# Patient Record
Sex: FEMALE | Race: WHITE | Employment: PART TIME
[De-identification: names, ages, dates, MRNs, and addresses within clinical notes are randomized per-mention and may not be internally consistent; named-entity substitution may affect disease eponyms.]

---

## 2017-01-27 ENCOUNTER — OFFICE VISIT (OUTPATIENT)
Dept: UROLOGY | Facility: CLINIC | Age: 27
End: 2017-01-27

## 2017-01-27 VITALS
TEMPERATURE: 98.4 F | HEIGHT: 63 IN | DIASTOLIC BLOOD PRESSURE: 73 MMHG | BODY MASS INDEX: 43.23 KG/M2 | SYSTOLIC BLOOD PRESSURE: 110 MMHG | WEIGHT: 244 LBS | HEART RATE: 82 BPM

## 2017-01-27 DIAGNOSIS — R35.0 URINARY FREQUENCY: ICD-10-CM

## 2017-01-27 DIAGNOSIS — R39.198 DIFFICULTY URINATING: Primary | ICD-10-CM

## 2017-01-27 LAB
APPEARANCE FLUID: ABNORMAL
BILIRUBIN, POC: ABNORMAL
BLOOD URINE, POC: ABNORMAL
CLARITY, POC: ABNORMAL
COLOR, POC: YELLOW
GLUCOSE URINE, POC: ABNORMAL
KETONES, POC: 80
LEUKOCYTE EST, POC: ABNORMAL
NITRITE, POC: ABNORMAL
PH, POC: 6
POST VOID RESIDUAL (PVR): 26 ML
PROTEIN, POC: ABNORMAL
SPECIFIC GRAVITY, POC: 1.01
UROBILINOGEN, POC: 0.2

## 2017-01-27 PROCEDURE — 81002 URINALYSIS NONAUTO W/O SCOPE: CPT | Performed by: UROLOGY

## 2017-01-27 PROCEDURE — 99204 OFFICE O/P NEW MOD 45 MIN: CPT | Performed by: UROLOGY

## 2017-01-27 PROCEDURE — 51798 US URINE CAPACITY MEASURE: CPT | Performed by: UROLOGY

## 2017-01-27 ASSESSMENT — ENCOUNTER SYMPTOMS
SHORTNESS OF BREATH: 1
WHEEZING: 1
GASTROINTESTINAL NEGATIVE: 1
COUGH: 1

## 2017-02-15 ENCOUNTER — TELEPHONE (OUTPATIENT)
Dept: FAMILY MEDICINE CLINIC | Facility: CLINIC | Age: 27
End: 2017-02-15

## 2017-02-15 RX ORDER — LEVOTHYROXINE SODIUM 0.12 MG/1
TABLET ORAL
Qty: 30 TABLET | Refills: 0 | Status: SHIPPED | OUTPATIENT
Start: 2017-02-15 | End: 2017-04-03 | Stop reason: SDUPTHER

## 2017-03-06 ENCOUNTER — TELEPHONE (OUTPATIENT)
Dept: FAMILY MEDICINE CLINIC | Facility: CLINIC | Age: 27
End: 2017-03-06

## 2017-03-06 RX ORDER — ALBUTEROL SULFATE 90 UG/1
2 AEROSOL, METERED RESPIRATORY (INHALATION) 4 TIMES DAILY
Qty: 1 INHALER | Refills: 0 | Status: SHIPPED | OUTPATIENT
Start: 2017-03-06 | End: 2017-03-06 | Stop reason: ALTCHOICE

## 2017-03-06 RX ORDER — ALBUTEROL SULFATE 90 UG/1
2 AEROSOL, METERED RESPIRATORY (INHALATION) EVERY 6 HOURS PRN
Qty: 1 INHALER | Refills: 3 | Status: SHIPPED | OUTPATIENT
Start: 2017-03-06 | End: 2017-08-28 | Stop reason: SDUPTHER

## 2017-03-31 ENCOUNTER — TELEPHONE (OUTPATIENT)
Dept: FAMILY MEDICINE CLINIC | Age: 27
End: 2017-03-31

## 2017-03-31 DIAGNOSIS — E03.9 HYPOTHYROIDISM, UNSPECIFIED TYPE: Primary | ICD-10-CM

## 2017-04-03 RX ORDER — LEVOTHYROXINE SODIUM 0.12 MG/1
TABLET ORAL
Qty: 30 TABLET | Refills: 3 | Status: SHIPPED | OUTPATIENT
Start: 2017-04-03 | End: 2017-08-28 | Stop reason: SDUPTHER

## 2017-04-07 ENCOUNTER — OFFICE VISIT (OUTPATIENT)
Dept: FAMILY MEDICINE CLINIC | Age: 27
End: 2017-04-07
Payer: MEDICAID

## 2017-04-07 ENCOUNTER — HOSPITAL ENCOUNTER (OUTPATIENT)
Age: 27
Setting detail: SPECIMEN
Discharge: HOME | End: 2017-04-07
Payer: MEDICAID

## 2017-04-07 VITALS
WEIGHT: 214.2 LBS | SYSTOLIC BLOOD PRESSURE: 139 MMHG | HEIGHT: 63 IN | TEMPERATURE: 97.3 F | DIASTOLIC BLOOD PRESSURE: 84 MMHG | BODY MASS INDEX: 37.95 KG/M2 | HEART RATE: 112 BPM

## 2017-04-07 DIAGNOSIS — E10.9 TYPE 1 DIABETES MELLITUS WITHOUT COMPLICATION (HCC): Primary | ICD-10-CM

## 2017-04-07 DIAGNOSIS — Z23 IMMUNIZATION DUE: ICD-10-CM

## 2017-04-07 DIAGNOSIS — N92.6 IRREGULAR MENSES: ICD-10-CM

## 2017-04-07 DIAGNOSIS — J45.40 MODERATE PERSISTENT ASTHMA WITHOUT COMPLICATION: ICD-10-CM

## 2017-04-07 LAB
HBA1C MFR BLD: 9.6 %
VITAMIN D 25-HYDROXY: 14.9 NG/ML (ref 30–100)

## 2017-04-07 PROCEDURE — 90715 TDAP VACCINE 7 YRS/> IM: CPT | Performed by: FAMILY MEDICINE

## 2017-04-07 PROCEDURE — 99213 OFFICE O/P EST LOW 20 MIN: CPT | Performed by: FAMILY MEDICINE

## 2017-04-07 PROCEDURE — 83036 HEMOGLOBIN GLYCOSYLATED A1C: CPT | Performed by: FAMILY MEDICINE

## 2017-04-07 PROCEDURE — 90472 IMMUNIZATION ADMIN EACH ADD: CPT | Performed by: FAMILY MEDICINE

## 2017-04-07 PROCEDURE — 90471 IMMUNIZATION ADMIN: CPT | Performed by: FAMILY MEDICINE

## 2017-04-07 PROCEDURE — 90732 PPSV23 VACC 2 YRS+ SUBQ/IM: CPT | Performed by: FAMILY MEDICINE

## 2017-04-07 RX ORDER — BUSPIRONE HYDROCHLORIDE 10 MG/1
15 TABLET ORAL 2 TIMES DAILY
COMMUNITY
End: 2020-10-21 | Stop reason: DRUGHIGH

## 2017-04-07 RX ORDER — INSULIN GLARGINE 100 [IU]/ML
30 INJECTION, SOLUTION SUBCUTANEOUS NIGHTLY
Qty: 1 VIAL | Refills: 3 | Status: SHIPPED | OUTPATIENT
Start: 2017-04-07 | End: 2017-04-11 | Stop reason: SDUPTHER

## 2017-04-07 RX ORDER — VENLAFAXINE HYDROCHLORIDE 150 MG/1
75 CAPSULE, EXTENDED RELEASE ORAL DAILY
COMMUNITY
End: 2019-01-11 | Stop reason: DRUGHIGH

## 2017-04-07 RX ORDER — FLUTICASONE PROPIONATE 110 UG/1
2 AEROSOL, METERED RESPIRATORY (INHALATION) 2 TIMES DAILY
Qty: 1 INHALER | Refills: 3 | Status: SHIPPED | OUTPATIENT
Start: 2017-04-07 | End: 2017-04-26 | Stop reason: ALTCHOICE

## 2017-04-07 ASSESSMENT — ENCOUNTER SYMPTOMS
VOMITING: 0
WHEEZING: 0
BACK PAIN: 0
PHOTOPHOBIA: 0
NAUSEA: 0
CONSTIPATION: 0
EYE DISCHARGE: 0
ABDOMINAL PAIN: 0
DIARRHEA: 0
SHORTNESS OF BREATH: 0
COUGH: 0

## 2017-04-09 DIAGNOSIS — E55.9 VITAMIN D DEFICIENCY: Primary | ICD-10-CM

## 2017-04-09 RX ORDER — ERGOCALCIFEROL 1.25 MG/1
50000 CAPSULE ORAL WEEKLY
Qty: 8 CAPSULE | Refills: 0 | Status: SHIPPED | OUTPATIENT
Start: 2017-04-09 | End: 2017-10-05 | Stop reason: ALTCHOICE

## 2017-04-11 DIAGNOSIS — E10.9 TYPE 1 DIABETES MELLITUS WITHOUT COMPLICATION (HCC): ICD-10-CM

## 2017-04-11 RX ORDER — INSULIN GLARGINE 100 [IU]/ML
30 INJECTION, SOLUTION SUBCUTANEOUS NIGHTLY
Qty: 1 VIAL | Refills: 3 | Status: SHIPPED | OUTPATIENT
Start: 2017-04-11 | End: 2017-04-26 | Stop reason: ALTCHOICE

## 2017-04-26 DIAGNOSIS — J45.30 MILD PERSISTENT ASTHMA WITHOUT COMPLICATION: ICD-10-CM

## 2017-04-26 DIAGNOSIS — E10.9 TYPE 1 DIABETES MELLITUS WITHOUT COMPLICATION (HCC): Primary | ICD-10-CM

## 2017-05-04 ENCOUNTER — TELEPHONE (OUTPATIENT)
Dept: FAMILY MEDICINE CLINIC | Age: 27
End: 2017-05-04

## 2017-05-04 DIAGNOSIS — J45.30 MILD PERSISTENT ASTHMA WITHOUT COMPLICATION: ICD-10-CM

## 2017-05-16 ENCOUNTER — HOSPITAL ENCOUNTER (OUTPATIENT)
Age: 27
Setting detail: SPECIMEN
Discharge: HOME | End: 2017-05-16
Payer: MEDICAID

## 2017-05-16 ENCOUNTER — OFFICE VISIT (OUTPATIENT)
Dept: OBGYN | Age: 27
End: 2017-05-16
Payer: MEDICAID

## 2017-05-16 VITALS
BODY MASS INDEX: 37.56 KG/M2 | HEART RATE: 81 BPM | WEIGHT: 212 LBS | HEIGHT: 63 IN | DIASTOLIC BLOOD PRESSURE: 73 MMHG | SYSTOLIC BLOOD PRESSURE: 125 MMHG

## 2017-05-16 DIAGNOSIS — Z01.419 WELL WOMAN EXAM WITH ROUTINE GYNECOLOGICAL EXAM: Primary | ICD-10-CM

## 2017-05-16 DIAGNOSIS — N92.6 IRREGULAR MENSES: ICD-10-CM

## 2017-05-16 DIAGNOSIS — N91.5 OLIGOMENORRHEA: ICD-10-CM

## 2017-05-16 PROBLEM — N93.9 ABNORMAL UTERINE BLEEDING: Status: ACTIVE | Noted: 2017-05-16

## 2017-05-16 LAB
DIRECT EXAM: ABNORMAL
FOLLICLE STIMULATING HORMONE: 4.4 U/L (ref 1.7–21.5)
HCG(URINE) PREGNANCY TEST: NEGATIVE
LH: 8.4 U/L (ref 1–95.6)
Lab: ABNORMAL
PROLACTIN: 10.29 UG/L (ref 4.79–23.3)
SPECIMEN DESCRIPTION: ABNORMAL
STATUS: ABNORMAL

## 2017-05-16 PROCEDURE — 99395 PREV VISIT EST AGE 18-39: CPT | Performed by: STUDENT IN AN ORGANIZED HEALTH CARE EDUCATION/TRAINING PROGRAM

## 2017-05-16 PROCEDURE — 84146 ASSAY OF PROLACTIN: CPT

## 2017-05-16 PROCEDURE — 83001 ASSAY OF GONADOTROPIN (FSH): CPT

## 2017-05-16 PROCEDURE — 36415 COLL VENOUS BLD VENIPUNCTURE: CPT

## 2017-05-16 PROCEDURE — 83002 ASSAY OF GONADOTROPIN (LH): CPT

## 2017-05-17 LAB
C TRACH DNA GENITAL QL NAA+PROBE: NEGATIVE
N. GONORRHOEAE DNA: NEGATIVE

## 2017-05-23 LAB
CYTOLOGY REPORT: NORMAL
HPV SAMPLE: NORMAL
HPV SOURCE: NORMAL
HPV, GENOTYPE 16: NOT DETECTED
HPV, GENOTYPE 18: NOT DETECTED
HPV, HIGH RISK OTHER: NOT DETECTED
HPV, INTERPRETATION: NORMAL

## 2017-06-27 ENCOUNTER — OFFICE VISIT (OUTPATIENT)
Dept: OBGYN | Age: 27
End: 2017-06-27
Payer: MEDICAID

## 2017-06-27 VITALS
WEIGHT: 203 LBS | HEIGHT: 64 IN | SYSTOLIC BLOOD PRESSURE: 124 MMHG | DIASTOLIC BLOOD PRESSURE: 78 MMHG | BODY MASS INDEX: 34.66 KG/M2 | HEART RATE: 90 BPM

## 2017-06-27 DIAGNOSIS — N92.6 IRREGULAR MENSES: Primary | ICD-10-CM

## 2017-06-27 DIAGNOSIS — N91.5 OLIGOMENORRHEA: ICD-10-CM

## 2017-06-27 DIAGNOSIS — B37.2 YEAST INFECTION OF THE SKIN: ICD-10-CM

## 2017-06-27 DIAGNOSIS — Z30.430 ENCOUNTER FOR INSERTION OF MIRENA IUD: ICD-10-CM

## 2017-06-27 PROBLEM — R87.629 ABNORMAL PAP SMEAR OF VAGINA: Status: ACTIVE | Noted: 2017-06-27

## 2017-06-27 LAB
CONTROL: NORMAL
PREGNANCY TEST URINE, POC: NEGATIVE

## 2017-06-27 PROCEDURE — 81025 URINE PREGNANCY TEST: CPT | Performed by: STUDENT IN AN ORGANIZED HEALTH CARE EDUCATION/TRAINING PROGRAM

## 2017-06-27 PROCEDURE — 58300 INSERT INTRAUTERINE DEVICE: CPT | Performed by: STUDENT IN AN ORGANIZED HEALTH CARE EDUCATION/TRAINING PROGRAM

## 2017-06-27 RX ORDER — KETOCONAZOLE 20 MG/G
CREAM TOPICAL
Qty: 30 G | Refills: 1 | Status: SHIPPED | OUTPATIENT
Start: 2017-06-27 | End: 2019-01-11 | Stop reason: ALTCHOICE

## 2017-07-28 ENCOUNTER — TELEPHONE (OUTPATIENT)
Dept: UROLOGY | Age: 27
End: 2017-07-28

## 2017-08-28 ENCOUNTER — TELEPHONE (OUTPATIENT)
Dept: FAMILY MEDICINE CLINIC | Age: 27
End: 2017-08-28

## 2017-08-28 ENCOUNTER — OFFICE VISIT (OUTPATIENT)
Dept: FAMILY MEDICINE CLINIC | Age: 27
End: 2017-08-28
Payer: MEDICAID

## 2017-08-28 VITALS
DIASTOLIC BLOOD PRESSURE: 69 MMHG | HEART RATE: 88 BPM | TEMPERATURE: 97.2 F | SYSTOLIC BLOOD PRESSURE: 98 MMHG | BODY MASS INDEX: 34.42 KG/M2 | WEIGHT: 201.6 LBS | HEIGHT: 64 IN

## 2017-08-28 DIAGNOSIS — E03.9 HYPOTHYROIDISM, UNSPECIFIED TYPE: ICD-10-CM

## 2017-08-28 DIAGNOSIS — J45.20 MILD INTERMITTENT ASTHMA WITHOUT COMPLICATION: ICD-10-CM

## 2017-08-28 DIAGNOSIS — Z00.00 HEALTHCARE MAINTENANCE: ICD-10-CM

## 2017-08-28 DIAGNOSIS — E10.9 TYPE 1 DIABETES MELLITUS WITHOUT COMPLICATION (HCC): Primary | ICD-10-CM

## 2017-08-28 LAB — HBA1C MFR BLD: 8.9 %

## 2017-08-28 PROCEDURE — 99213 OFFICE O/P EST LOW 20 MIN: CPT | Performed by: FAMILY MEDICINE

## 2017-08-28 PROCEDURE — 83036 HEMOGLOBIN GLYCOSYLATED A1C: CPT | Performed by: FAMILY MEDICINE

## 2017-08-28 RX ORDER — ALBUTEROL SULFATE 90 UG/1
2 AEROSOL, METERED RESPIRATORY (INHALATION) EVERY 6 HOURS PRN
Qty: 1 INHALER | Refills: 3 | Status: SHIPPED | OUTPATIENT
Start: 2017-08-28 | End: 2018-01-08 | Stop reason: SDUPTHER

## 2017-08-28 RX ORDER — ATORVASTATIN CALCIUM 20 MG/1
20 TABLET, FILM COATED ORAL DAILY
Qty: 30 TABLET | Refills: 3 | Status: SHIPPED | OUTPATIENT
Start: 2017-08-28 | End: 2018-01-05 | Stop reason: SDUPTHER

## 2017-08-28 RX ORDER — LEVOTHYROXINE SODIUM 0.12 MG/1
TABLET ORAL
Qty: 30 TABLET | Refills: 3 | Status: SHIPPED | OUTPATIENT
Start: 2017-08-28 | End: 2018-01-02 | Stop reason: SDUPTHER

## 2017-08-28 ASSESSMENT — ENCOUNTER SYMPTOMS
NAUSEA: 0
ABDOMINAL PAIN: 0
WHEEZING: 0
BLURRED VISION: 0
DIARRHEA: 0
VOMITING: 0
CONSTIPATION: 0
SHORTNESS OF BREATH: 0
COUGH: 0

## 2017-08-29 DIAGNOSIS — E10.9 TYPE 1 DIABETES MELLITUS WITHOUT COMPLICATION (HCC): ICD-10-CM

## 2017-09-01 ENCOUNTER — TELEPHONE (OUTPATIENT)
Dept: ADMINISTRATIVE | Age: 27
End: 2017-09-01

## 2017-09-01 DIAGNOSIS — E10.9 TYPE 1 DIABETES MELLITUS WITHOUT COMPLICATION (HCC): ICD-10-CM

## 2017-09-03 DIAGNOSIS — E10.9 TYPE 1 DIABETES MELLITUS WITHOUT COMPLICATION (HCC): ICD-10-CM

## 2017-09-03 RX ORDER — GLUCOSAMINE HCL/CHONDROITIN SU 500-400 MG
CAPSULE ORAL
Qty: 100 STRIP | Refills: 5 | Status: SHIPPED | OUTPATIENT
Start: 2017-09-03 | End: 2017-09-13 | Stop reason: SDUPTHER

## 2017-09-06 ENCOUNTER — OFFICE VISIT (OUTPATIENT)
Dept: FAMILY MEDICINE CLINIC | Age: 27
End: 2017-09-06
Payer: MEDICAID

## 2017-09-06 VITALS
HEIGHT: 63 IN | WEIGHT: 205 LBS | DIASTOLIC BLOOD PRESSURE: 80 MMHG | HEART RATE: 96 BPM | BODY MASS INDEX: 36.32 KG/M2 | TEMPERATURE: 98.1 F | SYSTOLIC BLOOD PRESSURE: 122 MMHG

## 2017-09-06 DIAGNOSIS — K64.9 HEMORRHOIDS, UNSPECIFIED HEMORRHOID TYPE: Primary | ICD-10-CM

## 2017-09-06 PROCEDURE — 99213 OFFICE O/P EST LOW 20 MIN: CPT | Performed by: FAMILY MEDICINE

## 2017-09-06 RX ORDER — DOCUSATE SODIUM 100 MG/1
100 CAPSULE, LIQUID FILLED ORAL DAILY PRN
Qty: 30 CAPSULE | Refills: 1 | Status: SHIPPED | OUTPATIENT
Start: 2017-09-06 | End: 2018-01-08 | Stop reason: SDUPTHER

## 2017-09-06 RX ORDER — GLUCOSAMINE HCL/CHONDROITIN SU 500-400 MG
CAPSULE ORAL
Qty: 100 STRIP | Refills: 5 | Status: CANCELLED | OUTPATIENT
Start: 2017-09-06

## 2017-09-06 RX ORDER — GLUCOSAMINE HCL/CHONDROITIN SU 500-400 MG
CAPSULE ORAL
Qty: 100 STRIP | Refills: 5 | OUTPATIENT
Start: 2017-09-06

## 2017-09-06 ASSESSMENT — ENCOUNTER SYMPTOMS
ABDOMINAL PAIN: 0
ANAL BLEEDING: 1
RECTAL PAIN: 1
CONSTIPATION: 1
BLOOD IN STOOL: 1

## 2017-09-06 NOTE — TELEPHONE ENCOUNTER
Hello pt scripts needs to say how many x a day the pt test, it is currently saying use as directed, she states she test up to 5 x a day, pt needs this ASAP, thanks writer

## 2017-09-13 ENCOUNTER — INITIAL CONSULT (OUTPATIENT)
Dept: SURGERY | Age: 27
End: 2017-09-13
Payer: MEDICAID

## 2017-09-13 VITALS
SYSTOLIC BLOOD PRESSURE: 119 MMHG | TEMPERATURE: 97 F | WEIGHT: 209.6 LBS | DIASTOLIC BLOOD PRESSURE: 77 MMHG | HEIGHT: 63 IN | HEART RATE: 79 BPM | BODY MASS INDEX: 37.14 KG/M2

## 2017-09-13 DIAGNOSIS — K62.5 RECTAL BLEEDING: Primary | ICD-10-CM

## 2017-09-13 DIAGNOSIS — E10.9 TYPE 1 DIABETES MELLITUS WITHOUT COMPLICATION (HCC): Primary | ICD-10-CM

## 2017-09-13 PROCEDURE — 99203 OFFICE O/P NEW LOW 30 MIN: CPT | Performed by: STUDENT IN AN ORGANIZED HEALTH CARE EDUCATION/TRAINING PROGRAM

## 2017-09-13 RX ORDER — POLYETHYLENE GLYCOL 3350 17 G/17G
17 POWDER, FOR SOLUTION ORAL DAILY
Qty: 476 G | Refills: 0 | Status: SHIPPED | OUTPATIENT
Start: 2017-09-13 | End: 2017-10-13

## 2017-09-13 RX ORDER — GLUCOSAMINE HCL/CHONDROITIN SU 500-400 MG
CAPSULE ORAL
Qty: 200 STRIP | Refills: 5 | Status: SHIPPED | OUTPATIENT
Start: 2017-09-13 | End: 2018-01-08 | Stop reason: SDUPTHER

## 2017-09-18 ENCOUNTER — TELEPHONE (OUTPATIENT)
Dept: FAMILY MEDICINE CLINIC | Age: 27
End: 2017-09-18

## 2017-09-21 ENCOUNTER — TELEPHONE (OUTPATIENT)
Dept: SURGERY | Age: 27
End: 2017-09-21

## 2017-09-21 NOTE — TELEPHONE ENCOUNTER
Writer mailed Chay Davey 17 Surgery Worksheet to the patient, faxed to [facility] and scanned in.

## 2017-10-05 ENCOUNTER — ANESTHESIA EVENT (OUTPATIENT)
Dept: OPERATING ROOM | Age: 27
End: 2017-10-05
Payer: MEDICAID

## 2017-10-05 RX ORDER — INSULIN GLARGINE 100 [IU]/ML
15 INJECTION, SOLUTION SUBCUTANEOUS NIGHTLY
COMMUNITY
End: 2018-05-09 | Stop reason: CLARIF

## 2017-10-06 ENCOUNTER — ANESTHESIA (OUTPATIENT)
Dept: OPERATING ROOM | Age: 27
End: 2017-10-06
Payer: MEDICAID

## 2017-10-06 ENCOUNTER — HOSPITAL ENCOUNTER (OUTPATIENT)
Age: 27
Setting detail: OUTPATIENT SURGERY
Discharge: HOME HEALTH | End: 2017-10-06
Attending: SURGERY | Admitting: SURGERY
Payer: MEDICAID

## 2017-10-06 VITALS
RESPIRATION RATE: 16 BRPM | SYSTOLIC BLOOD PRESSURE: 95 MMHG | WEIGHT: 196.43 LBS | DIASTOLIC BLOOD PRESSURE: 50 MMHG | OXYGEN SATURATION: 97 % | BODY MASS INDEX: 33.54 KG/M2 | TEMPERATURE: 97.2 F | HEIGHT: 64 IN | HEART RATE: 66 BPM

## 2017-10-06 VITALS — OXYGEN SATURATION: 100 % | SYSTOLIC BLOOD PRESSURE: 85 MMHG | DIASTOLIC BLOOD PRESSURE: 33 MMHG

## 2017-10-06 LAB
EKG ATRIAL RATE: 70 BPM
EKG P AXIS: 54 DEGREES
EKG P-R INTERVAL: 132 MS
EKG Q-T INTERVAL: 430 MS
EKG QRS DURATION: 86 MS
EKG QTC CALCULATION (BAZETT): 464 MS
EKG R AXIS: 84 DEGREES
EKG T AXIS: 48 DEGREES
EKG VENTRICULAR RATE: 70 BPM
GLUCOSE BLD-MCNC: 164 MG/DL (ref 65–105)
GLUCOSE BLD-MCNC: 186 MG/DL (ref 65–105)
HCG, PREGNANCY URINE (POC): NEGATIVE

## 2017-10-06 PROCEDURE — 6360000002 HC RX W HCPCS: Performed by: NURSE ANESTHETIST, CERTIFIED REGISTERED

## 2017-10-06 PROCEDURE — 93005 ELECTROCARDIOGRAM TRACING: CPT

## 2017-10-06 PROCEDURE — 7100000041 HC SPAR PHASE II RECOVERY - ADDTL 15 MIN: Performed by: SURGERY

## 2017-10-06 PROCEDURE — 3700000001 HC ADD 15 MINUTES (ANESTHESIA): Performed by: SURGERY

## 2017-10-06 PROCEDURE — 2500000003 HC RX 250 WO HCPCS: Performed by: NURSE ANESTHETIST, CERTIFIED REGISTERED

## 2017-10-06 PROCEDURE — 2580000003 HC RX 258: Performed by: ANESTHESIOLOGY

## 2017-10-06 PROCEDURE — 7100000040 HC SPAR PHASE II RECOVERY - FIRST 15 MIN: Performed by: SURGERY

## 2017-10-06 PROCEDURE — 3609010300 HC COLONOSCOPY W/BIOPSY SINGLE/MULTIPLE: Performed by: SURGERY

## 2017-10-06 PROCEDURE — 82947 ASSAY GLUCOSE BLOOD QUANT: CPT

## 2017-10-06 PROCEDURE — 84703 CHORIONIC GONADOTROPIN ASSAY: CPT

## 2017-10-06 PROCEDURE — 3700000000 HC ANESTHESIA ATTENDED CARE: Performed by: SURGERY

## 2017-10-06 RX ORDER — FENTANYL CITRATE 50 UG/ML
INJECTION, SOLUTION INTRAMUSCULAR; INTRAVENOUS PRN
Status: DISCONTINUED | OUTPATIENT
Start: 2017-10-06 | End: 2017-10-06 | Stop reason: SDUPTHER

## 2017-10-06 RX ORDER — PROPOFOL 10 MG/ML
INJECTION, EMULSION INTRAVENOUS PRN
Status: DISCONTINUED | OUTPATIENT
Start: 2017-10-06 | End: 2017-10-06 | Stop reason: SDUPTHER

## 2017-10-06 RX ORDER — ONDANSETRON 2 MG/ML
INJECTION INTRAMUSCULAR; INTRAVENOUS PRN
Status: DISCONTINUED | OUTPATIENT
Start: 2017-10-06 | End: 2017-10-06 | Stop reason: SDUPTHER

## 2017-10-06 RX ORDER — MIDAZOLAM HYDROCHLORIDE 1 MG/ML
INJECTION INTRAMUSCULAR; INTRAVENOUS PRN
Status: DISCONTINUED | OUTPATIENT
Start: 2017-10-06 | End: 2017-10-06 | Stop reason: SDUPTHER

## 2017-10-06 RX ORDER — SODIUM CHLORIDE, SODIUM LACTATE, POTASSIUM CHLORIDE, CALCIUM CHLORIDE 600; 310; 30; 20 MG/100ML; MG/100ML; MG/100ML; MG/100ML
INJECTION, SOLUTION INTRAVENOUS CONTINUOUS
Status: DISCONTINUED | OUTPATIENT
Start: 2017-10-06 | End: 2017-10-06 | Stop reason: HOSPADM

## 2017-10-06 RX ORDER — LIDOCAINE HYDROCHLORIDE 10 MG/ML
INJECTION, SOLUTION EPIDURAL; INFILTRATION; INTRACAUDAL; PERINEURAL PRN
Status: DISCONTINUED | OUTPATIENT
Start: 2017-10-06 | End: 2017-10-06 | Stop reason: SDUPTHER

## 2017-10-06 RX ADMIN — LIDOCAINE HYDROCHLORIDE 50 MG: 10 INJECTION, SOLUTION EPIDURAL; INFILTRATION; INTRACAUDAL; PERINEURAL at 09:02

## 2017-10-06 RX ADMIN — FENTANYL CITRATE 100 MCG: 50 INJECTION INTRAMUSCULAR; INTRAVENOUS at 09:06

## 2017-10-06 RX ADMIN — MIDAZOLAM HYDROCHLORIDE 2 MG: 1 INJECTION, SOLUTION INTRAMUSCULAR; INTRAVENOUS at 08:51

## 2017-10-06 RX ADMIN — PROPOFOL 1000 MG: 10 INJECTION, EMULSION INTRAVENOUS at 09:02

## 2017-10-06 RX ADMIN — SODIUM CHLORIDE, POTASSIUM CHLORIDE, SODIUM LACTATE AND CALCIUM CHLORIDE: 600; 310; 30; 20 INJECTION, SOLUTION INTRAVENOUS at 08:19

## 2017-10-06 RX ADMIN — SODIUM CHLORIDE, POTASSIUM CHLORIDE, SODIUM LACTATE AND CALCIUM CHLORIDE: 600; 310; 30; 20 INJECTION, SOLUTION INTRAVENOUS at 09:26

## 2017-10-06 RX ADMIN — PHENYLEPHRINE HYDROCHLORIDE 100 MCG: 10 INJECTION INTRAMUSCULAR; INTRAVENOUS; SUBCUTANEOUS at 09:25

## 2017-10-06 RX ADMIN — ONDANSETRON 4 MG: 2 INJECTION, SOLUTION INTRAMUSCULAR; INTRAVENOUS at 09:13

## 2017-10-06 ASSESSMENT — PAIN SCALES - GENERAL
PAINLEVEL_OUTOF10: 0

## 2017-10-06 ASSESSMENT — PAIN - FUNCTIONAL ASSESSMENT: PAIN_FUNCTIONAL_ASSESSMENT: 0-10

## 2017-10-06 NOTE — IP AVS SNAPSHOT
After Visit Summary  (Discharge Instructions)    Medication List for Home    Based on the information you provided to us as well as any changes during this visit, the following is your updated medication list.  Compare this with your prescription bottles at home. If you have any questions or concerns, contact your primary care physician's office. Daily Medication List (This medication list can be shared with any healthcare provider who is helping you manage your medications)      These are medications you told us you were taking at home, CONTINUE taking them after you leave the hospital        Last Dose    Next Dose Due AM NOON PM NIGHT    albuterol sulfate  (90 Base) MCG/ACT inhaler   Commonly known as:  VENTOLIN HFA   Inhale 2 puffs into the lungs every 6 hours as needed for Wheezing                                         atorvastatin 20 MG tablet   Commonly known as:  LIPITOR   Take 1 tablet by mouth daily                                         BLOOD GLUCOSE TEST STRIPS Strp   Use as directed 4 times daily                                         busPIRone 10 MG tablet   Commonly known as:  BUSPAR   Take 10 mg by mouth daily                                         docusate sodium 100 MG capsule   Commonly known as:  COLACE   Take 1 capsule by mouth daily as needed for Constipation                                         EFFEXOR  MG extended release capsule   Generic drug:  venlafaxine   Take 150 mg by mouth daily                                         fluticasone 100 MCG/ACT Aepb   Commonly known as:  ARNUITY ELLIPTA   Inhale 1 puff into the lungs daily                                         HUMALOG 100 UNIT/ML injection vial   Generic drug:  insulin lispro   Inject into the skin 3 times daily (before meals)                                         hydrocortisone 2.5 % rectal cream   Commonly known as:  ANUSOL-HC   Place rectally 2 times daily. insulin glargine 100 UNIT/ML injection pen   Commonly known as:  BASAGLAR KWIKPEN   Inject 22 Units into the skin nightly                                         insulin glargine 100 UNIT/ML injection vial   Commonly known as:  LANTUS   Inject 15 Units into the skin nightly                                         ketoconazole 2 % cream   Commonly known as:  NIZORAL   Apply topically daily. levothyroxine 125 MCG tablet   Commonly known as:  SYNTHROID   TAKE ONE TABLET BY MOUTH ONCE DAILY                                         polyethylene glycol powder   Commonly known as:  MIRALAX   Take 17 g by mouth daily                                                 Allergies as of 10/6/2017     No Known Allergies      Immunizations as of 10/6/2017     Name Date Dose VIS Date Route    Pneumococcal Polysaccharide ([id number]) 4/7/2017 0.5 mL 4/24/2015 Intramuscular    Tdap (Boostrix, Adacel) 4/7/2017 0.5 mL 2/24/2015 Intramuscular      Last Vitals          Most Recent Value    Temp  97.2 °F (36.2 °C)    Pulse  81    Resp  16    BP  (!)  88/43         After Visit Summary    This summary was created for you. Thank you for entrusting your care to us. The following information includes details about your hospital/visit stay along with steps you should take to help with your recovery once you leave the hospital.  In this packet, you will find information about the topics listed below:    · Instructions about your medications including a list of your home medications  · A summary of your hospital visit  · Follow-up appointments once you have left the hospital  · Your care plan at home      You may receive a survey regarding the care you received during your stay. Your input is valuable to us. We encourage you to complete and return your survey in the envelope provided. We hope you will choose us in the future for your healthcare needs.           Patient Information Patient Name [address] 1990      Care Provided at:     Name Address Phone       [facility] [address] [address] [phone]            Your Visit    Here you will find information about your visit, including the reason for your visit. Please take this sheet with you when you visit your doctor or other health care provider in the future. It will help determine the best possible medical care for you at that time. If you have any questions once you leave the hospital, please call the department phone number listed below. Why you were here     Your primary diagnosis was:  Not on File      Visit Information     Date & Time Provider Department Dept. Phone    10/6/2017 [address], [facility] [phone]       Follow-up Appointments    Below is a list of your follow-up and future appointments. This may not be a complete list as you may have made appointments directly with providers that we are not aware of or your providers may have made some for you. Please call your providers to confirm appointments. It is important to keep your appointments. Please bring your current insurance card, photo ID, co-pay, and all medication bottles to your appointment. If self-pay, payment is expected at the time of service. Follow-up Information     Follow up with Connie [address], DO. Specialty:  General Surgery    Why:  post op colonoscopy    Contact information:    [address] [id number] [phone]        Preventive Care        Date Due    Diabetic Foot Exam 12/30/2001    Cholesterol Screening 12/30/2001    Urine Check For Kidney Problems 12/30/2009    Yearly Flu Vaccine (1) 9/1/2017    Eye Exam By An Eye Doctor 4/7/2018 (Originally 12/30/2001)    HIV screening is recommended for all people regardless of risk factors  aged 15-65 years at least once (lifetime) who have never been HIV tested.  4/7/2018 (Originally 12/30/2006) The day or night before the procedure, you drink a large amount of a special liquid. This causes loose, frequent stools. You will go to the bathroom a lot. It is very important to drink all of the colon prep liquid. If you have problems drinking the liquid, call your doctor. For many people, the prep is worse than the test. It may be uncomfortable, and you may feel hungry on the clear liquid diet. Some people do not go to work or do their usual activities on the day of the prep. Arrange to have someone take you home after the test.  What can you expect after a colonoscopy? The nurses will watch you for 1 to 2 hours until the medicines wear off. Then you can go home. You will need a ride. Your doctor will tell you when you can eat and do your usual activities. Your doctor will talk to you about when you will need your next colonoscopy. The results of your test and your risk for colorectal cancer will help your doctor decide how often you need to be checked. Follow-up care is a key part of your treatment and safety. Be sure to make and go to all appointments, and call your doctor if you are having problems. It's also a good idea to know your test results and keep a list of the medicines you take. Where can you learn more? Go to https://[employer].SintecMedia. org and sign in to your NASOFORM account. Enter W238 in the Adim8 box to learn more about \"Learning About Colonoscopy. \"     If you do not have an account, please click on the \"Sign Up Now\" link. Current as of: July 26, 2016  Content Version: 11.3  © 9167-2786 [employer]. Care instructions adapted under license by [facility] ([facility]). If you have questions about a medical condition or this instruction, always ask your healthcare professional. [place] [address] any warranty or liability for your use of this information.                Colonoscopy: What to Expect at [address] [place] [address] After you have a colonoscopy, you will stay at the clinic for 1 to 2 hours until the medicines wear off. Then you can go home. But you will need to arrange for a ride. Your doctor will tell you when you can eat and do your other usual activities. Your doctor will talk to you about when you will need your next colonoscopy. Your doctor can help you decide how often you need to be checked. This will depend on the results of your test and your risk for colorectal cancer. After the test, you may be bloated or have gas pains. You may need to pass gas. If a biopsy was done or a polyp was removed, you may have streaks of blood in your stool (feces) for a few days. This care sheet gives you a general idea about how long it will take for you to recover. But each person recovers at a different pace. Follow the steps below to get better as quickly as possible. How can you care for yourself at home? Activity  · Rest when you feel tired. · You can do your normal activities when it feels okay to do so. Diet  · Follow your doctor's directions for eating. · Unless your doctor has told you not to, drink plenty of fluids. This helps to replace the fluids that were lost during the colon prep. · Do not drink alcohol. Medicines  · Your doctor will tell you if and when you can restart your medicines. He or she will also give you instructions about taking any new medicines. · If you take blood thinners, such as warfarin (Coumadin), clopidogrel (Plavix), or aspirin, be sure to talk to your doctor. He or she will tell you if and when to start taking those medicines again. Make sure that you understand exactly what your doctor wants you to do. · If polyps were removed or a biopsy was done during the test, your doctor may tell you not to take aspirin or other anti-inflammatory medicines for a few days. These include ibuprofen (Advil, Motrin) and naproxen (Aleve).   Other instructions Hemorrhoids: Care Instructions  Your Care Instructions    Hemorrhoids are enlarged veins that develop in the anal canal. Bleeding during bowel movements, itching, swelling, and rectal pain are the most common symptoms. They can be uncomfortable at times, but hemorrhoids rarely are a serious problem. You can treat most hemorrhoids with simple changes to your diet and bowel habits. These changes include eating more fiber and not straining to pass stools. Most hemorrhoids do not need surgery or other treatment unless they are very large and painful or bleed a lot. Follow-up care is a key part of your treatment and safety. Be sure to make and go to all appointments, and call your doctor if you are having problems. Its also a good idea to know your test results and keep a list of the medicines you take. How can you care for yourself at home? · Sit in a few inches of warm water (sitz bath) 3 times a day and after bowel movements. The warm water helps with pain and itching. · Put ice on your anal area several times a day for 10 minutes at a time. Put a thin cloth between the ice and your skin. Follow this by placing a warm, wet towel on the area for another 10 to 20 minutes. · Take pain medicines exactly as directed. ¨ If the doctor gave you a prescription medicine for pain, take it as prescribed. ¨ If you are not taking a prescription pain medicine, ask your doctor if you can take an over-the-counter medicine. · Keep the anal area clean, but be gentle. Use water and a fragrance-free soap, such as [place], or use baby wipes or medicated pads, such as Tucks. · Wear cotton underwear and loose clothing to decrease moisture in the anal area. · Eat more fiber. Include foods such as whole-grain breads and cereals, raw vegetables, raw and dried fruits, and beans. · Drink plenty of fluids, enough so that your urine is light yellow or clear like water.  If you have kidney, heart, or liver disease and have to A high-fiber diet may help you relieve constipation and feel less bloated. Your doctor and dietitian will help you make a high-fiber eating plan based on your personal needs. The plan will include the things you like to eat. It will also make sure that you get 30 grams of fiber a day. Before you make changes to the way you eat, be sure to talk with your doctor or dietitian. Follow-up care is a key part of your treatment and safety. Be sure to make and go to all appointments, and call your doctor if you are having problems. It's also a good idea to know your test results and keep a list of the medicines you take. How can you care for yourself at home? · You can increase how much fiber you get if you eat more of certain foods. These foods include:  ¨ Whole-grain breads and cereals. ¨ Fruits, such as pears, apples, and peaches. Eat the skins and peels if you can. ¨ Vegetables, such as broccoli, cabbage, spinach, carrots, asparagus, and squash. ¨ Starchy vegetables. These include potatoes with skins, kidney beans, and lima beans. · Take a fiber supplement every day if your doctor recommends it. Examples are Benefiber, Citrucel, FiberCon, and Metamucil. Ask your doctor how much to take. · Drink plenty of fluids, enough so that your urine is light yellow or clear like water. If you have kidney, heart, or liver disease and have to limit fluids, talk with your doctor before you increase the amount of fluids you drink. · Get some exercise every day. Exercise helps stool move through the colon. It also helps prevent constipation. · Keep a food diary. Try to notice and write down what foods cause gas, pain, or other symptoms. Then you can avoid these foods. Where can you learn more? Go to https://chcarloseb.RxApps. org and sign in to your iOculi account. Enter P642 in the Appiphany box to learn more about \"High-Fiber Diet: Care Instructions. \"

## 2017-10-06 NOTE — BRIEF OP NOTE
Brief Postoperative Note  ______________________________________________________________    Patient: Jatinder Vale  YOB: 1990  MRN: [id number]  Date of Procedure: 10/6/2017    Pre-Op Diagnosis: RECTAL BLEEDING    Post-Op Diagnosis: Mild internal hemorrhoid X1 and 1 external hemorrhoid no sign of thrombosis. Procedure(s):  COLONOSCOPY     Anesthesia: General    Surgeon(s):  Guilherme Yoder DO    Staff:  Scrub Person First: Tracey Vines     Estimated Blood Loss: * No values recorded between 10/6/2017  8:48 AM and 10/6/2017 07:52 AM *    Complications: None    Specimens:   * No specimens in log *    Implants:  * No implants in log *      Drains:           Findings: Normal appear rectum, sigmoid, descending, transverse, ascending and cecum.  Mild internal hemorrhoid X1, small non-thrombosed external hemorrhoid posterior position    Karen Green DO  Date: 10/6/2017  Time: 10:00 AM

## 2017-10-06 NOTE — ANESTHESIA PRE PROCEDURE
Department of Anesthesiology  Preprocedure Note       Name:  Kaylee Matos   Age:  23 y.o.  :  1990                                          MRN:  [id number]         Date:  10/6/2017      Surgeon: Olivia Ramos):  Ezequiel Jones DO    Procedure: Procedure(s):  COLONOSCOPY WITH POSSIBLE BIOPSY, POSSIBLE POLYPECTOMY, POSSIBLE HEMORRHOID BANDING    Medications prior to admission:   Prior to Admission medications    Medication Sig Start Date End Date Taking? Authorizing Provider   insulin glargine (LANTUS) 100 UNIT/ML injection vial Inject 15 Units into the skin nightly   Yes Historical Provider, MD   insulin lispro (HUMALOG) 100 UNIT/ML injection vial Inject into the skin 3 times daily (before meals)   Yes Historical Provider, MD   polyethylene glycol (MIRALAX) powder Take 17 g by mouth daily 9/13/17 10/13/17 Yes Fani Fernández DO   hydrocortisone (ANUSOL-HC) 2.5 % rectal cream Place rectally 2 times daily.  17  Yes Alicia Soriano MD   docusate sodium (COLACE) 100 MG capsule Take 1 capsule by mouth daily as needed for Constipation 17  Yes Alicia Soriano MD   albuterol sulfate HFA (VENTOLIN HFA) 108 (90 Base) MCG/ACT inhaler Inhale 2 puffs into the lungs every 6 hours as needed for Wheezing 17  Yes Jade Figueroa MD   atorvastatin (LIPITOR) 20 MG tablet Take 1 tablet by mouth daily 17  Yes Jade Figueroa MD   levothyroxine (SYNTHROID) 125 MCG tablet TAKE ONE TABLET BY MOUTH ONCE DAILY 17  Yes Jade Figueroa MD   venlafaxine (EFFEXOR XR) 150 MG extended release capsule Take 150 mg by mouth daily   Yes Historical Provider, MD   busPIRone (BUSPAR) 10 MG tablet Take 10 mg by mouth daily   Yes Historical Provider, MD   Glucose Blood (BLOOD GLUCOSE TEST STRIPS) STRP Use as directed 4 times daily 17   Jade Figueroa MD   insulin glargine [facility]) 100 UNIT/ML injection pen Inject 22 Units into the skin nightly 17  Jade Figueroa MD   ketoconazole (NIZORAL) 2

## 2017-10-06 NOTE — OP NOTE
blood in the stool, unexplained weight loss, or change in the bowels      Electronically signed by Rohini Garcia DO  on 10/6/2017 at 10:02 AM

## 2017-10-06 NOTE — H&P
equivalent per week          Comment: ooccasionally    Drug use: Yes       Special: Marijuana         Comment: almost daily    Sexual activity: Not Currently           Other Topics Concern    Not on file      Social History Narrative            ROS:   General: Denies fever, chills, night sweats, weight loss, malaise, fatigue  HEENT: Denies sore throat, sinus problems, allergic rhinosinusitis  Card: +htn Denies chest pain, palpitations, orthopnea/PND. Denies h/o murmurs  Pulm: Denies cough, shortness of breath, GAINES  GI:  per HPI;   : Denies polyuria, dysuria, hematuria  Endo:+DM I and thyroid disorder. Heme: Denies anemia, h/o bleeding or clotting problems. Neuro: Denies h/o CVA, TIA  Skin: Denies rashes, ulcers  Musculoskeletal: Denies muscle, joint, back pain.        Physical Exam:      Vitals:     09/13/17 0831   BP: 119/77   Pulse: 79   Temp: 97 °F (36.1 °C)      General:A & O x3  HEENT:  NCAT, PERRL, EMOI, oral mucus membrane pink and moist, no mass palpated on neck exam  BREAST:Deferred  Heart: S1S2,   Lungs: equal and symmetric chest rise/fall, non-labored  Abdomen: soft, nontender, no HSM, no guarding, no rebound, no masses  RECTAL: skin tag at posterior position without fissure. No external hemorrhoids noted. ROSELYN no palpable masses, no gross blood and good rectal tone. Extremity: negative  SKIN: Skin color, texture, turgor normal. No rashes or lesions. Neuro: CN II-XII grossly intact. No motor or sensory deficits appreciated. MK:normal throughout upper and lower extremities     ASSESSMENT:  1. 24 yo F 10yo h/o rectal bleeding a/w dyschezia. Moderately improved with prep H, steroid cream, colace and increased fiber. Pt most likely with internal hemorrhoids, fissure cannot be ruled out. Low likely dao of rectal/colon cancer.      1. Rectal bleeding            PLAN:  1.  Given nature of bleeding per rectum will schedule pt for colonoscopy with possible biopsy/polypectomy/hemorrhoidal banding in near

## 2017-10-06 NOTE — IP AVS SNAPSHOT
Patient Information     Patient Name PHOEBE Keith 1990         This is your updated medication list to keep with you all times      TAKE these medications     albuterol sulfate  (90 Base) MCG/ACT inhaler   Commonly known as:  VENTOLIN HFA   Inhale 2 puffs into the lungs every 6 hours as needed for Wheezing       atorvastatin 20 MG tablet   Commonly known as:  LIPITOR   Take 1 tablet by mouth daily       BLOOD GLUCOSE TEST STRIPS Strp   Use as directed 4 times daily       busPIRone 10 MG tablet   Commonly known as:  BUSPAR       docusate sodium 100 MG capsule   Commonly known as:  COLACE   Take 1 capsule by mouth daily as needed for Constipation       EFFEXOR  MG extended release capsule   Generic drug:  venlafaxine       fluticasone 100 MCG/ACT Aepb   Commonly known as:  ARNUITY ELLIPTA   Inhale 1 puff into the lungs daily       HUMALOG 100 UNIT/ML injection vial   Generic drug:  insulin lispro       hydrocortisone 2.5 % rectal cream   Commonly known as:  ANUSOL-HC   Place rectally 2 times daily. * insulin glargine 100 UNIT/ML injection pen   Commonly known as:  BASAGLAR KWIKPEN   Inject 22 Units into the skin nightly       * insulin glargine 100 UNIT/ML injection vial   Commonly known as:  LANTUS       ketoconazole 2 % cream   Commonly known as:  NIZORAL   Apply topically daily. levothyroxine 125 MCG tablet   Commonly known as:  SYNTHROID   TAKE ONE TABLET BY MOUTH ONCE DAILY       polyethylene glycol powder   Commonly known as:  MIRALAX   Take 17 g by mouth daily       * Notice: This list has 2 medication(s) that are the same as other medications prescribed for you. Read the directions carefully, and ask your doctor or other care provider to review them with you.

## 2017-10-18 ENCOUNTER — OFFICE VISIT (OUTPATIENT)
Dept: SURGERY | Age: 27
End: 2017-10-18
Payer: MEDICAID

## 2017-10-18 VITALS
HEIGHT: 64 IN | SYSTOLIC BLOOD PRESSURE: 113 MMHG | WEIGHT: 201 LBS | BODY MASS INDEX: 34.31 KG/M2 | TEMPERATURE: 98.3 F | DIASTOLIC BLOOD PRESSURE: 66 MMHG | HEART RATE: 75 BPM

## 2017-10-18 DIAGNOSIS — K64.8 HEMORRHOIDS, INTERNAL: ICD-10-CM

## 2017-10-18 DIAGNOSIS — K62.5 RECTAL BLEEDING: ICD-10-CM

## 2017-10-18 DIAGNOSIS — K64.4 HEMORRHOIDS, EXTERNAL WITHOUT COMPLICATIONS: Primary | ICD-10-CM

## 2017-10-18 PROCEDURE — 99024 POSTOP FOLLOW-UP VISIT: CPT | Performed by: SURGERY

## 2017-10-18 RX ORDER — DOCUSATE SODIUM 100 MG/1
100 CAPSULE, LIQUID FILLED ORAL DAILY
Qty: 30 CAPSULE | Refills: 5 | Status: SHIPPED | OUTPATIENT
Start: 2017-10-18 | End: 2018-11-15 | Stop reason: SDUPTHER

## 2017-10-18 NOTE — PROGRESS NOTES
[facility] SURGERY CLINIC PROGRESS NOTE    Date: October 18, 2017     Subjective:  Shonna Hooker is a 23 y.o. female who returns to the [facility] surgery clinic today for follow up to colonoscopy performed 10/6/2017, found to have mild internal and external hemorrhoids without active bleeding or complications, returns today to report she is doing well, no other incidents of BRBPR, states she is taking her metamucil BID and stool softeners, no other complaints at this time. Review of Systems - negative except per HPI  Notes reviewed, and agree with documentation in pt's chart. Objective:  Vitals:    10/18/17 0839   BP: 113/66   Pulse: 75   Temp: 98.3 °F (36.8 °C)       GEN: Appears healthy. Alert; in no acute distress. Pleasant. HEENT:PERRLA, EOMI  NECK: trachea midline  HEART: S1 and S2.    LUNGS: symmetrical chest rise bilaterally  ABDOMEN: Abdomen soft, non-tender. No masses,  No organomegaly  EXT: no cyanosis, clubbing or edema present    NEURO: no focal deficits  SKIN:No rashes or nodules noted. Assessment:    1. Hemorrhoids, external without complications  docusate sodium (COLACE) 100 MG capsule   2. Rectal bleeding     3. Hemorrhoids, internal         Plan:  Continue metamucil, pt can continue to take stool softeners as needed, discussed increasing and maintaining dietary fiber and water intake. Return as needed.     Seen/discussed with Dr. Eliecer Vázquez    Electronically signed by King Sun DO on 10/18/2017 at 9:03 AM

## 2018-01-02 DIAGNOSIS — E03.9 HYPOTHYROIDISM, UNSPECIFIED TYPE: ICD-10-CM

## 2018-01-03 RX ORDER — LEVOTHYROXINE SODIUM 0.12 MG/1
TABLET ORAL
Qty: 30 TABLET | Refills: 3 | Status: SHIPPED | OUTPATIENT
Start: 2018-01-03 | End: 2018-01-08 | Stop reason: SDUPTHER

## 2018-01-05 NOTE — TELEPHONE ENCOUNTER
Please address the medication refill and close the encounter. If I can be of assistance, please route to the applicable pool. Thank you.   Next Visit Date:  Future Appointments  Date Time Provider Georgina Nguyen   1/8/2018 8:15 AM Vesna Arroyo MD [address] [place] [address] Maintenance   Topic Date Due    Diabetic foot exam  12/30/2001    Lipid screen  12/30/2001    Diabetic microalbuminuria test  12/30/2009    Flu vaccine (1) 09/01/2017    TSH testing  12/30/2017    Diabetic retinal exam  04/07/2018 (Originally 12/30/2001)    HIV screen  04/07/2018 (Originally 12/30/2006)    A1C test (Diabetic or Prediabetic)  08/28/2018    Cervical cancer screen  05/16/2020    DTaP/Tdap/Td vaccine (2 - Td) 04/07/2027    Pneumococcal med risk  Completed       Hemoglobin A1C (%)   Date Value   08/28/2017 8.9   04/07/2017 9.6   11/11/2016 7.6             ( goal A1C is < 7)   No results found for: LABMICR  No results found for: LDLCHOLESTEROL, LDLCALC    (goal LDL is <100)   No results found for: AST, ALT, BUN  BP Readings from Last 3 Encounters:   10/18/17 113/66   10/06/17 (!) 85/33   10/06/17 (!) 95/50          (goal 120/80)    All Future Testing planned in [facility]  Lab Frequency Next Occurrence   Lipid Panel Once 08/28/2018   Microalbumin, Ur Once 08/28/2018               Patient Active Problem List:     Type 1 diabetes mellitus without complication (HCC)     Depression     Hypothyroidism     Oligomenorrhea     Abnormal uterine bleeding     LSIL w/ (-) HPV cotesting     Mirena IUD 6/27/17     Yeast infection of the skin

## 2018-01-06 RX ORDER — ATORVASTATIN CALCIUM 20 MG/1
TABLET, FILM COATED ORAL
Qty: 30 TABLET | Refills: 3 | Status: SHIPPED | OUTPATIENT
Start: 2018-01-06 | End: 2018-01-08 | Stop reason: SDUPTHER

## 2018-01-08 ENCOUNTER — HOSPITAL ENCOUNTER (OUTPATIENT)
Age: 28
Setting detail: SPECIMEN
Discharge: HOME | End: 2018-01-08
Payer: MEDICAID

## 2018-01-08 ENCOUNTER — OFFICE VISIT (OUTPATIENT)
Dept: FAMILY MEDICINE CLINIC | Age: 28
End: 2018-01-08
Payer: MEDICAID

## 2018-01-08 ENCOUNTER — TELEPHONE (OUTPATIENT)
Dept: FAMILY MEDICINE CLINIC | Age: 28
End: 2018-01-08

## 2018-01-08 VITALS
TEMPERATURE: 97.9 F | HEART RATE: 69 BPM | BODY MASS INDEX: 34.21 KG/M2 | SYSTOLIC BLOOD PRESSURE: 109 MMHG | WEIGHT: 200.4 LBS | HEIGHT: 64 IN | DIASTOLIC BLOOD PRESSURE: 68 MMHG

## 2018-01-08 DIAGNOSIS — E10.9 TYPE 1 DIABETES MELLITUS WITHOUT COMPLICATION (HCC): ICD-10-CM

## 2018-01-08 DIAGNOSIS — J45.30 MILD PERSISTENT ASTHMA WITHOUT COMPLICATION: ICD-10-CM

## 2018-01-08 DIAGNOSIS — E03.9 HYPOTHYROIDISM, UNSPECIFIED TYPE: ICD-10-CM

## 2018-01-08 DIAGNOSIS — K64.9 HEMORRHOIDS, UNSPECIFIED HEMORRHOID TYPE: ICD-10-CM

## 2018-01-08 LAB
CHOLESTEROL, FASTING: 176 MG/DL
CHOLESTEROL/HDL RATIO: 1.9
CREATININE URINE: 126.4 MG/DL (ref 28–217)
HBA1C MFR BLD: 7.8 %
HDLC SERPL-MCNC: 91 MG/DL
LDL CHOLESTEROL: 74 MG/DL (ref 0–130)
MICROALBUMIN/CREAT 24H UR: <12 MG/L
MICROALBUMIN/CREAT UR-RTO: NORMAL MCG/MG CREAT
TRIGLYCERIDE, FASTING: 55 MG/DL
TSH SERPL DL<=0.05 MIU/L-ACNC: 7.87 MIU/L (ref 0.3–5)
VLDLC SERPL CALC-MCNC: NORMAL MG/DL (ref 1–30)

## 2018-01-08 PROCEDURE — 96127 BRIEF EMOTIONAL/BEHAV ASSMT: CPT | Performed by: FAMILY MEDICINE

## 2018-01-08 PROCEDURE — 90688 IIV4 VACCINE SPLT 0.5 ML IM: CPT | Performed by: FAMILY MEDICINE

## 2018-01-08 PROCEDURE — G8417 CALC BMI ABV UP PARAM F/U: HCPCS | Performed by: FAMILY MEDICINE

## 2018-01-08 PROCEDURE — 1036F TOBACCO NON-USER: CPT | Performed by: FAMILY MEDICINE

## 2018-01-08 PROCEDURE — G8484 FLU IMMUNIZE NO ADMIN: HCPCS | Performed by: FAMILY MEDICINE

## 2018-01-08 PROCEDURE — 3045F PR MOST RECENT HEMOGLOBIN A1C LEVEL 7.0-9.0%: CPT | Performed by: FAMILY MEDICINE

## 2018-01-08 PROCEDURE — 83036 HEMOGLOBIN GLYCOSYLATED A1C: CPT | Performed by: FAMILY MEDICINE

## 2018-01-08 PROCEDURE — 99214 OFFICE O/P EST MOD 30 MIN: CPT | Performed by: FAMILY MEDICINE

## 2018-01-08 PROCEDURE — 90471 IMMUNIZATION ADMIN: CPT | Performed by: FAMILY MEDICINE

## 2018-01-08 PROCEDURE — G8427 DOCREV CUR MEDS BY ELIG CLIN: HCPCS | Performed by: FAMILY MEDICINE

## 2018-01-08 RX ORDER — LEVOTHYROXINE SODIUM 0.12 MG/1
TABLET ORAL
Qty: 30 TABLET | Refills: 1 | Status: SHIPPED | OUTPATIENT
Start: 2018-01-08 | End: 2018-01-08 | Stop reason: DRUGHIGH

## 2018-01-08 RX ORDER — GLUCOSAMINE HCL/CHONDROITIN SU 500-400 MG
CAPSULE ORAL
Qty: 200 STRIP | Refills: 5 | Status: SHIPPED | OUTPATIENT
Start: 2018-01-08 | End: 2019-01-18 | Stop reason: SDUPTHER

## 2018-01-08 RX ORDER — ALBUTEROL SULFATE 90 UG/1
2 AEROSOL, METERED RESPIRATORY (INHALATION) EVERY 6 HOURS PRN
Qty: 1 INHALER | Refills: 3 | Status: SHIPPED | OUTPATIENT
Start: 2018-01-08 | End: 2020-10-21 | Stop reason: SDUPTHER

## 2018-01-08 RX ORDER — ATORVASTATIN CALCIUM 20 MG/1
TABLET, FILM COATED ORAL
Qty: 30 TABLET | Refills: 3 | Status: SHIPPED | OUTPATIENT
Start: 2018-01-08 | End: 2018-11-15 | Stop reason: SDUPTHER

## 2018-01-08 RX ORDER — DOCUSATE SODIUM 100 MG/1
100 CAPSULE, LIQUID FILLED ORAL DAILY PRN
Qty: 30 CAPSULE | Refills: 1 | Status: SHIPPED | OUTPATIENT
Start: 2018-01-08 | End: 2018-08-16 | Stop reason: SDUPTHER

## 2018-01-08 RX ORDER — LEVOTHYROXINE SODIUM 150 UG/1
1 CAPSULE ORAL DAILY
Qty: 30 CAPSULE | Refills: 3 | Status: SHIPPED | OUTPATIENT
Start: 2018-01-08 | End: 2018-05-09

## 2018-01-08 ASSESSMENT — PATIENT HEALTH QUESTIONNAIRE - PHQ9
3. TROUBLE FALLING OR STAYING ASLEEP: 3
1. LITTLE INTEREST OR PLEASURE IN DOING THINGS: 3
SUM OF ALL RESPONSES TO PHQ9 QUESTIONS 1 & 2: 4
6. FEELING BAD ABOUT YOURSELF - OR THAT YOU ARE A FAILURE OR HAVE LET YOURSELF OR YOUR FAMILY DOWN: 3
10. IF YOU CHECKED OFF ANY PROBLEMS, HOW DIFFICULT HAVE THESE PROBLEMS MADE IT FOR YOU TO DO YOUR WORK, TAKE CARE OF THINGS AT HOME, OR GET ALONG WITH OTHER PEOPLE: 1
2. FEELING DOWN, DEPRESSED OR HOPELESS: 1
SUM OF ALL RESPONSES TO PHQ QUESTIONS 1-9: 22
4. FEELING TIRED OR HAVING LITTLE ENERGY: 3
9. THOUGHTS THAT YOU WOULD BE BETTER OFF DEAD, OR OF HURTING YOURSELF: 1
7. TROUBLE CONCENTRATING ON THINGS, SUCH AS READING THE NEWSPAPER OR WATCHING TELEVISION: 3
5. POOR APPETITE OR OVEREATING: 3
8. MOVING OR SPEAKING SO SLOWLY THAT OTHER PEOPLE COULD HAVE NOTICED. OR THE OPPOSITE, BEING SO FIGETY OR RESTLESS THAT YOU HAVE BEEN MOVING AROUND A LOT MORE THAN USUAL: 2

## 2018-01-08 ASSESSMENT — ENCOUNTER SYMPTOMS
COUGH: 0
WHEEZING: 0
SINUS PRESSURE: 0
TROUBLE SWALLOWING: 0
ANAL BLEEDING: 1
SHORTNESS OF BREATH: 0
CHEST TIGHTNESS: 0
RHINORRHEA: 1

## 2018-01-08 NOTE — PROGRESS NOTES
endocrinologist as per her insistence . She has been advised to take good care of her feet as she does not want to see a podiatrist at this time  - insulin glargine (BASAGLAR KWIKPEN) 100 UNIT/ML injection pen; Inject 22 Units into the skin nightly  Dispense: 5 pen; Refill: 3  - insulin lispro (HUMALOG) 100 UNIT/ML injection vial; Inject 25 Units into the skin 3 times daily (before meals)  Dispense: 1 vial; Refill: 2  - POCT glycosylated hemoglobin (Hb A1C)  - Lipid, Fasting; Future  - Microalbumin, Ur; Future  - Tracie Currie MD, [facility] [place]    4. Hypothyroidism, unspecified type  If there are any changes in TSH level appropriate Synthroid dosing can be called into the pharmacy  - levothyroxine (SYNTHROID) 125 MCG tablet; TAKE ONE TABLET BY MOUTH ONCE DAILY  Dispense: 30 tablet; Refill: 1  - TSH without Reflex  - Tracie Currie MD, [facility] [place]  Return in about 6 months (around 7/8/2018).

## 2018-03-21 DIAGNOSIS — E10.9 TYPE 1 DIABETES MELLITUS WITHOUT COMPLICATION (HCC): ICD-10-CM

## 2018-03-21 NOTE — TELEPHONE ENCOUNTER
Pharmacy requesting refill on Humalog 100Unit/ML Inj quantity 100    Next Visit Date:  Future Appointments  Date Time Provider Georgina Wendy   5/9/2018 4:15 PM Yulisa Trujillo MD [address] Maintenance   Topic Date Due    Diabetic foot exam  12/30/2001    Diabetic retinal exam  04/07/2018 (Originally 12/30/2001)    HIV screen  04/07/2018 (Originally 12/30/2006)    A1C test (Diabetic or Prediabetic)  01/08/2019    Diabetic microalbuminuria test  01/08/2019    Lipid screen  01/08/2019    TSH testing  01/08/2019    Cervical cancer screen  05/16/2020    DTaP/Tdap/Td vaccine (2 - Td) 04/07/2027    Flu vaccine  Completed    Pneumococcal med risk  Completed       Hemoglobin A1C (%)   Date Value   01/08/2018 7.8   08/28/2017 8.9   04/07/2017 9.6             ( goal A1C is < 7)   Microalb/Crt.  Ratio (mcg/mg creat)   Date Value   01/08/2018 CANNOT BE CALCULATED     LDL Cholesterol (mg/dL)   Date Value   01/08/2018 74       (goal LDL is <100)   No results found for: AST, ALT, BUN  BP Readings from Last 3 Encounters:   01/08/18 109/68   10/18/17 113/66   10/06/17 (!) 85/33          (goal 120/80)    All Future Testing planned in [facility]  Lab Frequency Next Occurrence   Lipid Panel Once 08/28/2018               Patient Active Problem List:     Type 1 diabetes mellitus without complication (HCC)     Depression     Hypothyroidism     Oligomenorrhea     Abnormal uterine bleeding     LSIL w/ (-) HPV cotesting     Mirena IUD 6/27/17     Yeast infection of the skin

## 2018-05-09 ENCOUNTER — INITIAL CONSULT (OUTPATIENT)
Dept: ENDOCRINOLOGY | Age: 28
End: 2018-05-09
Payer: MEDICAID

## 2018-05-09 VITALS
SYSTOLIC BLOOD PRESSURE: 118 MMHG | TEMPERATURE: 99.2 F | WEIGHT: 179.8 LBS | OXYGEN SATURATION: 98 % | HEIGHT: 64 IN | DIASTOLIC BLOOD PRESSURE: 80 MMHG | HEART RATE: 84 BPM | BODY MASS INDEX: 30.7 KG/M2 | RESPIRATION RATE: 18 BRPM

## 2018-05-09 DIAGNOSIS — E10.9 TYPE 1 DIABETES MELLITUS WITHOUT COMPLICATION (HCC): Primary | ICD-10-CM

## 2018-05-09 DIAGNOSIS — E03.9 ACQUIRED HYPOTHYROIDISM: ICD-10-CM

## 2018-05-09 PROCEDURE — G8427 DOCREV CUR MEDS BY ELIG CLIN: HCPCS | Performed by: INTERNAL MEDICINE

## 2018-05-09 PROCEDURE — 2022F DILAT RTA XM EVC RTNOPTHY: CPT | Performed by: INTERNAL MEDICINE

## 2018-05-09 PROCEDURE — G8417 CALC BMI ABV UP PARAM F/U: HCPCS | Performed by: INTERNAL MEDICINE

## 2018-05-09 PROCEDURE — 99244 OFF/OP CNSLTJ NEW/EST MOD 40: CPT | Performed by: INTERNAL MEDICINE

## 2018-05-09 RX ORDER — LEVOTHYROXINE SODIUM 0.15 MG/1
150 TABLET ORAL DAILY
Qty: 30 TABLET | Refills: 5 | Status: SHIPPED | OUTPATIENT
Start: 2018-05-09 | End: 2018-11-12 | Stop reason: SDUPTHER

## 2018-05-09 RX ORDER — ACETYLCYSTEINE 600 MG
CAPSULE ORAL
Refills: 1 | COMMUNITY
Start: 2018-04-10 | End: 2019-01-11

## 2018-05-10 ENCOUNTER — HOSPITAL ENCOUNTER (OUTPATIENT)
Age: 28
Setting detail: SPECIMEN
Discharge: HOME | End: 2018-05-10
Payer: MEDICAID

## 2018-05-10 DIAGNOSIS — E10.9 TYPE 1 DIABETES MELLITUS WITHOUT COMPLICATION (HCC): ICD-10-CM

## 2018-05-10 LAB
ALBUMIN SERPL-MCNC: 4 G/DL (ref 3.5–5.2)
ALBUMIN/GLOBULIN RATIO: 1.3 (ref 1–2.5)
ALP BLD-CCNC: 93 U/L (ref 35–104)
ALT SERPL-CCNC: 17 U/L (ref 5–33)
ANION GAP SERPL CALCULATED.3IONS-SCNC: 13 MMOL/L (ref 9–17)
AST SERPL-CCNC: 20 U/L
BILIRUB SERPL-MCNC: 0.54 MG/DL (ref 0.3–1.2)
BILIRUBIN URINE: NEGATIVE
BUN BLDV-MCNC: 16 MG/DL (ref 6–20)
BUN/CREAT BLD: ABNORMAL (ref 9–20)
CALCIUM SERPL-MCNC: 9.1 MG/DL (ref 8.6–10.4)
CHLORIDE BLD-SCNC: 98 MMOL/L (ref 98–107)
CO2: 22 MMOL/L (ref 20–31)
COLOR: ABNORMAL
COMMENT UA: ABNORMAL
CREAT SERPL-MCNC: 0.63 MG/DL (ref 0.5–0.9)
CREATININE URINE: 69.6 MG/DL (ref 28–217)
GFR AFRICAN AMERICAN: >60 ML/MIN
GFR NON-AFRICAN AMERICAN: >60 ML/MIN
GFR SERPL CREATININE-BSD FRML MDRD: ABNORMAL ML/MIN/{1.73_M2}
GFR SERPL CREATININE-BSD FRML MDRD: ABNORMAL ML/MIN/{1.73_M2}
GLUCOSE BLD-MCNC: 342 MG/DL (ref 70–99)
GLUCOSE URINE: ABNORMAL
KETONES, URINE: ABNORMAL
LEUKOCYTE ESTERASE, URINE: NEGATIVE
MICROALBUMIN/CREAT 24H UR: <12 MG/L
MICROALBUMIN/CREAT UR-RTO: NORMAL MCG/MG CREAT
NITRITE, URINE: NEGATIVE
PH UA: 7 (ref 5–8)
POTASSIUM SERPL-SCNC: 4.3 MMOL/L (ref 3.7–5.3)
PROTEIN UA: NEGATIVE
SODIUM BLD-SCNC: 133 MMOL/L (ref 135–144)
SPECIFIC GRAVITY UA: 1.02 (ref 1–1.03)
TOTAL PROTEIN: 7.1 G/DL (ref 6.4–8.3)
TURBIDITY: CLEAR
URINE HGB: NEGATIVE
UROBILINOGEN, URINE: NORMAL

## 2018-06-11 ENCOUNTER — TELEPHONE (OUTPATIENT)
Dept: FAMILY MEDICINE CLINIC | Age: 28
End: 2018-06-11

## 2018-06-25 DIAGNOSIS — E10.9 TYPE 1 DIABETES MELLITUS WITHOUT COMPLICATION (HCC): ICD-10-CM

## 2018-07-09 ENCOUNTER — OFFICE VISIT (OUTPATIENT)
Dept: ENDOCRINOLOGY | Age: 28
End: 2018-07-09
Payer: MEDICAID

## 2018-07-09 VITALS
HEART RATE: 76 BPM | HEIGHT: 64 IN | BODY MASS INDEX: 30.22 KG/M2 | DIASTOLIC BLOOD PRESSURE: 80 MMHG | WEIGHT: 177 LBS | RESPIRATION RATE: 18 BRPM | SYSTOLIC BLOOD PRESSURE: 118 MMHG | TEMPERATURE: 98.1 F

## 2018-07-09 DIAGNOSIS — E10.9 TYPE 1 DIABETES MELLITUS WITHOUT COMPLICATION (HCC): Primary | ICD-10-CM

## 2018-07-09 DIAGNOSIS — E03.9 ACQUIRED HYPOTHYROIDISM: ICD-10-CM

## 2018-07-09 LAB — HBA1C MFR BLD: 6.9 %

## 2018-07-09 PROCEDURE — 2022F DILAT RTA XM EVC RTNOPTHY: CPT | Performed by: INTERNAL MEDICINE

## 2018-07-09 PROCEDURE — 83036 HEMOGLOBIN GLYCOSYLATED A1C: CPT | Performed by: INTERNAL MEDICINE

## 2018-07-09 PROCEDURE — 83036 HEMOGLOBIN GLYCOSYLATED A1C: CPT | Performed by: FAMILY MEDICINE

## 2018-07-09 PROCEDURE — G8427 DOCREV CUR MEDS BY ELIG CLIN: HCPCS | Performed by: INTERNAL MEDICINE

## 2018-07-09 PROCEDURE — 3044F HG A1C LEVEL LT 7.0%: CPT | Performed by: INTERNAL MEDICINE

## 2018-07-09 PROCEDURE — 99214 OFFICE O/P EST MOD 30 MIN: CPT | Performed by: INTERNAL MEDICINE

## 2018-07-09 PROCEDURE — 1036F TOBACCO NON-USER: CPT | Performed by: INTERNAL MEDICINE

## 2018-07-09 PROCEDURE — G8417 CALC BMI ABV UP PARAM F/U: HCPCS | Performed by: INTERNAL MEDICINE

## 2018-07-09 NOTE — PROGRESS NOTES
150 MCG DAILY     NO HX OF HYPERTENSION  OR KIDNEY PROBLEMS     HAS HYPERCHOLESTEROLEMIA TAKES LIPITOR 20 MG DAILY     HAS HX OF ASTHMA STABLE USES PRN ALBUTEROL INHALER.     HX OF DEPRESSION AND ANXIETY ON EFFEXOR 150 MG DAILY AND BUSPAR 10 MG DAILY. SHE STATES SMOKING MARIJUANA DAILY  WHICH HELPS HER ANXIETY.     SHE HAS  IUD FOR CONTRACEPTION     DENIES POLYURIA OR POLY DYPSIA  NO VISION CHANGES. NO RETINOPATHY  OCCASIONAL PARESTHESIAS IN TOES  NO CLAUDICATION  NO DIZZINESS.     NO CHEST PAIN   NO SHORTNESS OF BREATH . HX HX OF ASTHMA USES PRN INHALER. NO COUGH. NO HX OF SLEEP APNEA. PAST HX OF SMOKING 1PPD FOR 3 YEARS. QUIT 2016  BOWELS  OCCASIONAL CONSTIPATION. NO ACID REFLUX. MENSES IRREGULAR. HAS IUD. OBESE MOST OF LIFE. Shawna Shepherd LOST WEIGHT  NO ARTHRITIC PAINS  HAS HX OF ANXIETY AND  DEPRESSION           Past Medical History:   Diagnosis Date    Asthma     Diabetes ([address]     Eczema     Herniated cervical disc     Hyperlipidemia     Hypothyroidism         Past Surgical History:   Procedure Laterality Date    BONE MARROW TRANSPLANT  1994    DONOR;  for Brother    CYST REMOVAL      LOWER LIP    GA COLONOSCOPY W/BIOPSY SINGLE/MULTIPLE N/A 10/6/2017    COLONOSCOPY performed by Elmer Beltran DO at [address] EXTRACTION       Family History   Problem Relation Age of Onset    Other Mother     Other Father     Cervical Cancer Maternal Grandmother     Cancer Maternal Grandmother         COLON    Diabetes Paternal Grandfather      Social History   Substance Use Topics    Smoking status: Former Smoker     Packs/day: 0.50     Years: 3.00     Types: Cigarettes     Quit date: 9/1/2016    Smokeless tobacco: Never Used    Alcohol use 0.6 oz/week     1 Standard drinks or equivalent per week      Comment: ooccasionally        Current Outpatient Prescriptions   Medication Sig Dispense Refill    insulin lispro (ADMELOG) 100 UNIT/ML injection vial TID BEFORE MEALS 1UNIT PER 5 GM CARB CORRECTION 1 UNIT FOR 30 POINTS ABOVE 100. ABOUT 70 UNITS DAILY 2 vial 8     MG CAPS   1    levothyroxine (SYNTHROID) 150 MCG tablet Take 1 tablet by mouth Daily 30 tablet 5    albuterol sulfate HFA (VENTOLIN HFA) 108 (90 Base) MCG/ACT inhaler Inhale 2 puffs into the lungs every 6 hours as needed for Wheezing 1 Inhaler 3    atorvastatin (LIPITOR) 20 MG tablet TAKE ONE TABLET BY MOUTH ONCE DAILY 30 tablet 3    docusate sodium (COLACE) 100 MG capsule Take 1 capsule by mouth daily as needed for Constipation 30 capsule 1    fluticasone (ARNUITY ELLIPTA) 100 MCG/ACT AEPB Inhale 1 puff into the lungs daily 30 each 3    Glucose Blood (BLOOD GLUCOSE TEST STRIPS) STRP Use as directed 4 times daily 200 strip 5    hydrocortisone (ANUSOL-HC) 2.5 % rectal cream Place rectally 2 times daily. 1 Tube 1    docusate sodium (COLACE) 100 MG capsule Take 1 capsule by mouth daily 30 capsule 5    ketoconazole (NIZORAL) 2 % cream Apply topically daily. 30 g 1    venlafaxine (EFFEXOR XR) 150 MG extended release capsule Take 150 mg by mouth daily      busPIRone (BUSPAR) 10 MG tablet Take 10 mg by mouth daily      insulin glargine (BASAGLAR KWIKPEN) 100 UNIT/ML injection pen Inject 22 Units into the skin nightly 5 pen 3     Current Facility-Administered Medications   Medication Dose Route Frequency Provider Last Rate Last Dose    levonorgestrel (MIRENA) IUD 52 mg 1 each  1 each Intrauterine Once Derrel Anna, DO   1 each at 06/27/17 1057       No Known Allergies      Subjective:      Review of Systems AS NOTED IN HPI      Objective:      Physical Exam     33YEAR OLD   WHITE FEMALE OBESE  IN NO DISTRESS  VITALS REVIEWED. WEIGHT STABLE SINCE 5/9/18  Eyes: ANGELI.   ENT: THROAT CLEAR. Mario Alberto Kidd HEARING NORMAL  Neck: NO MASSES. NO ADENOPATHY. THYROID NOT ENLARGED. NO CAROTID BRUIT  Heart: REGULAR. NO MURMUR   Lungs: BREATHING COMFORTABLY. LUNGS CLEAR. NO WHEEZES  Abdomen: SOFT. NO TENDERNESS.  NO MASSES LIVER AND SPLEEN NOT PALPABLE  Extremities: NO EDEMA. NO CALF TENDERNESS. NO VARICOSE VEINS. RT BIG TOE MEDIALLY DRY SKIN NOTED. . NO CALLUSES NOTED OVER FEET. Peripheral vascular : PEDAL PULSES GOOD   Rheumatological : NO JOINT SWELLING  Neurological/Memory: ALERT  AND ORIENTED  X 3 ,MONOFILAMENT SENSATIONS NORMAL. REFLEXES NORMAL. Psychiatric:  MOOD AND AFFECT NORMAL  Skin: NO RASHES.        Assessment:   LAB / IMAGING    POCT A1C 7.9 ON 7/9/18  ENTERED IN ERROR.  CORRECT A1C WAS  6.9    Urinalysis   Collected:  5/10/2018 14:20    Ref Range & Units 1mo ago   Color, UA YEL DARK YELLOW     Turbidity UA CLEAR CLEAR    Glucose, Ur NEG 3+     Bilirubin Urine NEG NEGATIVE    Ketones, Urine NEG TRACE     Specific Gravity, UA 1.005 - 1.030 1.024    Urine Hgb NEG NEGATIVE    pH, UA 5.0 - 8.0 7.0    Protein, UA NEG NEGATIVE    Urobilinogen, Urine NORM Normal    Nitrite, Urine NEG NEGATIVE    Leukocyte Esterase, Urine NEG NEGATIVE          Microalbumin, Ur   Order: [id number]   Collected:  5/10/2018 14:20    Ref Range & Units 1mo ago   Microalb, Ur <21 mg/L <12    Creatinine, Ur 28.0 - 217.0 mg/dL 69.6            Comprehensive Metabolic Panel   Order: [id number]   Collected:  5/10/2018 14:20    Ref Range & Units 1mo ago   Glucose 70 - 99 mg/dL 342     BUN 6 - 20 mg/dL 16    CREATININE 0.50 - 0.90 mg/dL 0.63    Bun/Cre Ratio 9 - 20 NOT REPORTED    Calcium 8.6 - 10.4 mg/dL 9.1    Sodium 135 - 144 mmol/L 133     Potassium 3.7 - 5.3 mmol/L 4.3    Chloride 98 - 107 mmol/L 98    CO2 20 - 31 mmol/L 22    Anion Gap 9 - 17 mmol/L 13    Alkaline Phosphatase 35 - 104 U/L 93    ALT 5 - 33 U/L 17    AST <32 U/L 20    Total Bilirubin 0.3 - 1.2 mg/dL 0.54    Total Protein 6.4 - 8.3 g/dL 7.1    Alb 3.5 - 5.2 g/dL 4.0    Albumin/Globulin Ratio 1.0 - 2.5 1.3    GFR Non-African American >60 mL/min >60    GFR African American >60 mL/min >60    GFR Comment                    PREVIOUS LAB:    POCT A1C ON 5/9/18  IS 7.7     1/8/18  Hemoglobin A1C 7.8       TSH 0.30 - 5.00 mIU/L 7.87        Microalb/Crt. Ratio <25 mcg/mg creat      Cholesterol, Fasting <200 mg/dL 176       HDL >40 mg/dL 91       LDL Cholesterol 0 - 130 mg/dL 74          Triglyceride, Fasting <150 mg/dL 55          IMPRESSION :     TYPE I DIABETES MELLITUS  POCT A1C 7.9 ON 7/9/18 ENTERED IN  ERROR. CORRECT A1C WAS 6.9 %    HYPOTHYROIDISM     HYPERCHOLESTEROLEMIA     OBESITY     HX OF DEPRESSION AND ANXIETY     ADVISED TO QUIT MARIJUANA     HAS IUD FOR CONTRACEPTION      1. Type 1 diabetes mellitus without complication ([facility] [address]    2. Acquired hypothyroidism          Plan:      1. ALL LAB DISCUSSED. A1C RESULT DISCUSSED  2. NCREASE BASAGLAR  PEN  TO 18  UNITS IN A.M  3. CONTINUE INSULIN LISPRO  VIAL  TID BEFORE MEALS 1UNIT PER 5 GM CARB CORRECTION 1 UNIT FOR 30 POINTS ABOVE 100. DOES NOT WANT PEN  4. DIET AND EXERCISE  5. MONITOR SUGARS 3 TO 4 TIMED DAILY  6. CONTINUE LEVOTHYROXINE 150 MCG  DAILY  7. CONTINUE LIPITOR 20 MG DAILY  8. CHECK TSH  9. RETURN 3 MONTHS       Orders Placed This Encounter   Procedures    TSH     Standing Status:   Future     Standing Expiration Date:   7/9/2019    POCT glycosylated hemoglobin (Hb A1C)     Orders Placed This Encounter   Medications    insulin lispro (ADMELOG) 100 UNIT/ML injection vial     Sig: TID BEFORE MEALS 1UNIT PER 5 GM CARB CORRECTION 1 UNIT FOR 30 POINTS ABOVE 100. ABOUT 70 UNITS DAILY     Dispense:  2 vial     Refill:  8        Return in about 3 months (around 10/9/2018) for Follow up Uncontrolled Diabetes, HYPOTHYROIDISM. Visit date not found        Patient given educational materials - see patient instructions. Discussed use, benefit, and side effects of prescribed medications. All patient questions answered. Pt voiced understanding. Reviewed health maintenance. Instructed to continue current medications, diet and exercise. Patient agreed with treatment plan. Follow up as directed.        Electronically signed by Roger Jenkins MD on 7/9/2018

## 2018-08-16 ENCOUNTER — OFFICE VISIT (OUTPATIENT)
Dept: FAMILY MEDICINE CLINIC | Age: 28
End: 2018-08-16
Payer: MEDICAID

## 2018-08-16 ENCOUNTER — HOSPITAL ENCOUNTER (OUTPATIENT)
Age: 28
Discharge: HOME | End: 2018-08-18
Payer: MEDICAID

## 2018-08-16 ENCOUNTER — HOSPITAL ENCOUNTER (OUTPATIENT)
Dept: GENERAL RADIOLOGY | Age: 28
Discharge: HOME | End: 2018-08-18
Payer: MEDICAID

## 2018-08-16 VITALS
HEIGHT: 63 IN | WEIGHT: 176.4 LBS | TEMPERATURE: 97 F | SYSTOLIC BLOOD PRESSURE: 110 MMHG | BODY MASS INDEX: 31.25 KG/M2 | DIASTOLIC BLOOD PRESSURE: 73 MMHG | HEART RATE: 73 BPM

## 2018-08-16 DIAGNOSIS — M25.532 LEFT WRIST PAIN: ICD-10-CM

## 2018-08-16 DIAGNOSIS — M25.532 LEFT WRIST PAIN: Primary | ICD-10-CM

## 2018-08-16 DIAGNOSIS — K59.00 CONSTIPATION, UNSPECIFIED CONSTIPATION TYPE: ICD-10-CM

## 2018-08-16 PROCEDURE — 1036F TOBACCO NON-USER: CPT | Performed by: FAMILY MEDICINE

## 2018-08-16 PROCEDURE — 99212 OFFICE O/P EST SF 10 MIN: CPT | Performed by: FAMILY MEDICINE

## 2018-08-16 PROCEDURE — 99213 OFFICE O/P EST LOW 20 MIN: CPT | Performed by: FAMILY MEDICINE

## 2018-08-16 PROCEDURE — G8417 CALC BMI ABV UP PARAM F/U: HCPCS | Performed by: FAMILY MEDICINE

## 2018-08-16 PROCEDURE — 73100 X-RAY EXAM OF WRIST: CPT

## 2018-08-16 PROCEDURE — G8427 DOCREV CUR MEDS BY ELIG CLIN: HCPCS | Performed by: FAMILY MEDICINE

## 2018-08-16 RX ORDER — DOCUSATE SODIUM 100 MG/1
100 CAPSULE, LIQUID FILLED ORAL DAILY PRN
Qty: 30 CAPSULE | Refills: 1 | Status: SHIPPED | OUTPATIENT
Start: 2018-08-16 | End: 2019-01-11 | Stop reason: DRUGHIGH

## 2018-08-16 RX ORDER — VORTIOXETINE 10 MG/1
TABLET, FILM COATED ORAL
Refills: 0 | COMMUNITY
Start: 2018-07-12 | End: 2018-11-15 | Stop reason: ALTCHOICE

## 2018-08-16 ASSESSMENT — ENCOUNTER SYMPTOMS
DIARRHEA: 0
COUGH: 0
SHORTNESS OF BREATH: 0
ABDOMINAL PAIN: 0
CONSTIPATION: 0
WHEEZING: 0
VOMITING: 0
NAUSEA: 0
BLURRED VISION: 0

## 2018-08-16 NOTE — PROGRESS NOTES
Visit Information    Have you changed or started any medications since your last visit including any over-the-counter medicines, vitamins, or herbal medicines? no   Have you stopped taking any of your medications? Is so, why? -  no  Are you having any side effects from any of your medications? - no    Have you seen any other physician or provider since your last visit?  no   Have you had any other diagnostic tests since your last visit?  no   Have you been seen in the emergency room and/or had an admission in a hospital since we last saw you?  no   Have you had your routine dental cleaning in the past 6 months?  no     Do you have an active MyChart account? If no, what is the barrier?   Yes    Patient Care Team:  Vilma Triplett MD as PCP - General (Family Medicine)    Medical History Review  Past Medical, Family, and Social History reviewed and does not contribute to the patient presenting condition    Health Maintenance   Topic Date Due    Diabetic foot exam  12/30/2001    Diabetic retinal exam  12/30/2001    HIV screen  12/30/2006    Flu vaccine (1) 09/01/2018    Lipid screen  01/08/2019    TSH testing  01/08/2019    Diabetic microalbuminuria test  05/10/2019    A1C test (Diabetic or Prediabetic)  07/09/2019    Cervical cancer screen  05/16/2020    DTaP/Tdap/Td vaccine (2 - Td) 04/07/2027    Pneumococcal med risk  Completed

## 2018-08-16 NOTE — PROGRESS NOTES
oriented to person, place, and time. Skin: She is not diaphoretic. Nursing note and vitals reviewed. BP Readings from Last 3 Encounters:   08/16/18 110/73   07/09/18 118/80   05/09/18 118/80     /73 (Site: Right Arm, Position: Sitting, Cuff Size: Medium Adult)   Pulse 73   Temp 97 °F (36.1 °C) (Oral)   Ht 5' 3\" (1.6 m)   Wt 176 lb 6.4 oz (80 kg)   BMI 31.25 kg/m²   Lab Results   Component Value Date    HDL 91 01/08/2018    ALT 17 05/10/2018    AST 20 05/10/2018     (L) 05/10/2018    K 4.3 05/10/2018    CL 98 05/10/2018    CREATININE 0.63 05/10/2018    BUN 16 05/10/2018    CO2 22 05/10/2018    TSH 7.87 (H) 01/08/2018    LABA1C 6.9 07/09/2018    LABMICR CANNOT BE CALCULATED 05/10/2018     Lab Results   Component Value Date    CALCIUM 9.1 05/10/2018     Lab Results   Component Value Date    LDLCHOLESTEROL 74 01/08/2018       Assessment and Plan:    1. Left wrist pain  Differential diagnosis includes scaphoid fracture, scapholunate instability, trapezium fracture, de Quervain's tenosynovitis and carpal metacarpal osteoarthritis. We'll obtain x-ray of the left breast.  Patient encouraged to wear a thumb spica cast.  Patient also encouraged on RICE therapy. Continue NSAIDs as needed. Return in 2 weeks. 2. Constipation  Refill Pedro Clemens received counseling on the following healthy behaviors: nutrition, exercise and medication adherence  Reviewed prior labs and health maintenance. Continue current medications, diet and exercise. Discussed use, benefit, and side effects of prescribed medications. Barriers to medication compliance addressed. Patient given educational materials - see patient instructions. All patient questions answered. Patient voiced understanding.      Requested Prescriptions     Signed Prescriptions Disp Refills    docusate sodium (COLACE) 100 MG capsule 30 capsule 1     Sig: Take 1 capsule by mouth daily as needed for Constipation       Medications Discontinued During This Encounter   Medication Reason    docusate sodium (COLACE) 100 MG capsule REORDER         Please note that this chart was generated using voice recognition Dragon dictation software.  Although every effort was made to ensure the accuracy of this automated transcription, some errors in transcription may have occurred

## 2018-08-24 ENCOUNTER — OFFICE VISIT (OUTPATIENT)
Dept: FAMILY MEDICINE CLINIC | Age: 28
End: 2018-08-24
Payer: MEDICAID

## 2018-08-24 VITALS
SYSTOLIC BLOOD PRESSURE: 119 MMHG | DIASTOLIC BLOOD PRESSURE: 72 MMHG | WEIGHT: 177 LBS | BODY MASS INDEX: 31.35 KG/M2 | HEART RATE: 85 BPM

## 2018-08-24 DIAGNOSIS — M25.532 LEFT WRIST PAIN: Primary | ICD-10-CM

## 2018-08-24 PROCEDURE — 99213 OFFICE O/P EST LOW 20 MIN: CPT | Performed by: FAMILY MEDICINE

## 2018-08-24 PROCEDURE — 1036F TOBACCO NON-USER: CPT | Performed by: FAMILY MEDICINE

## 2018-08-24 PROCEDURE — G8417 CALC BMI ABV UP PARAM F/U: HCPCS | Performed by: FAMILY MEDICINE

## 2018-08-24 PROCEDURE — G8427 DOCREV CUR MEDS BY ELIG CLIN: HCPCS | Performed by: FAMILY MEDICINE

## 2018-08-24 RX ORDER — MELOXICAM 7.5 MG/1
7.5 TABLET ORAL DAILY
Qty: 30 TABLET | Refills: 1 | Status: SHIPPED | OUTPATIENT
Start: 2018-08-24 | End: 2018-11-12 | Stop reason: SDUPTHER

## 2018-08-24 ASSESSMENT — ENCOUNTER SYMPTOMS
DIARRHEA: 0
COUGH: 0
ABDOMINAL PAIN: 0
VOMITING: 0
SHORTNESS OF BREATH: 0
NAUSEA: 0
WHEEZING: 0
CONSTIPATION: 0
BLURRED VISION: 0

## 2018-08-24 NOTE — PATIENT INSTRUCTIONS
[id number] [employer]. Care instructions adapted under license by [facility] ([facility]). If you have questions about a medical condition or this instruction, always ask your healthcare professional. [address] any warranty or liability for your use of this information.

## 2018-08-24 NOTE — PROGRESS NOTES
I have reviewed and discussed key elements of [address] with the resident including plan of care and follow up and agree with the care chritsin plan.
Visit Information    Have you changed or started any medications since your last visit including any over-the-counter medicines, vitamins, or herbal medicines? Have you stopped taking any of your medications? Is so, why? -  no  Are you having any side effects from any of your medications? Have you seen any other physician or provider since your last visit?  no   Have you had any other diagnostic tests since your last visit?  no   Have you been seen in the emergency room and/or had an admission in a hospital since we last saw you?  no   Have you had your routine dental cleaning in the past 6 months?  no     Do you have an active MyChart account? If no, what is the barrier?   No:     Patient Care Team:  Naresh Fall MD as PCP - General (Family Medicine)    Medical History Review  Past Medical, Family, and Social History reviewed and does not contribute to the patient presenting condition    Health Maintenance   Topic Date Due    Diabetic foot exam  12/30/2001    Diabetic retinal exam  12/30/2001    HIV screen  12/30/2006    Flu vaccine (1) 09/01/2018    Lipid screen  01/08/2019    TSH testing  01/08/2019    Diabetic microalbuminuria test  05/10/2019    A1C test (Diabetic or Prediabetic)  07/09/2019    Cervical cancer screen  05/16/2020    DTaP/Tdap/Td vaccine (2 - Td) 04/07/2027    Pneumococcal med risk  Completed
note and vitals reviewed. BP Readings from Last 3 Encounters:   08/24/18 119/72   08/16/18 110/73   07/09/18 118/80     /72 (Site: Left Arm, Position: Sitting, Cuff Size: Medium Adult)   Pulse 85   Wt 177 lb (80.3 kg)   BMI 31.35 kg/m²   Lab Results   Component Value Date    HDL 91 01/08/2018    ALT 17 05/10/2018    AST 20 05/10/2018     (L) 05/10/2018    K 4.3 05/10/2018    CL 98 05/10/2018    CREATININE 0.63 05/10/2018    BUN 16 05/10/2018    CO2 22 05/10/2018    TSH 7.87 (H) 01/08/2018    LABA1C 6.9 07/09/2018    LABMICR CANNOT BE CALCULATED 05/10/2018     Lab Results   Component Value Date    CALCIUM 9.1 05/10/2018     Lab Results   Component Value Date    LDLCHOLESTEROL 74 01/08/2018       Assessment and Plan:    1. Left wrist pain  Pain likely secondary to ligamentous injury. Refer patient to physical therapy. We'll also prescribe Mobic 7.5 mg daily. Continue thumb spica cast.  Follow-up in 6 weeks. If no improvement in symptoms, then will refer patient to orthopedics. [facility] received counseling on the following healthy behaviors: nutrition, exercise and medication adherence  Reviewed prior labs and health maintenance. Continue current medications, diet and exercise. Discussed use, benefit, and side effects of prescribed medications. Barriers to medication compliance addressed. Patient given educational materials - see patient instructions. All patient questions answered. Patient voiced understanding. Requested Prescriptions     Signed Prescriptions Disp Refills    meloxicam (MOBIC) 7.5 MG tablet 30 tablet 1     Sig: Take 1 tablet by mouth daily       There are no discontinued medications.       Please note that this chart was generated using voice recognition Dragon dictation software.  Although every effort was made to ensure the accuracy of this automated transcription, some errors in transcription may have occurred

## 2018-08-27 ENCOUNTER — TELEPHONE (OUTPATIENT)
Dept: FAMILY MEDICINE CLINIC | Age: 28
End: 2018-08-27

## 2018-08-27 DIAGNOSIS — M25.532 LEFT WRIST PAIN: Primary | ICD-10-CM

## 2018-09-11 RX ORDER — ATORVASTATIN CALCIUM 20 MG/1
TABLET, FILM COATED ORAL
Qty: 30 TABLET | Refills: 3 | Status: SHIPPED | OUTPATIENT
Start: 2018-09-11 | End: 2019-01-22 | Stop reason: SDUPTHER

## 2018-11-12 RX ORDER — LEVOTHYROXINE SODIUM 0.15 MG/1
TABLET ORAL
Qty: 90 TABLET | Refills: 0 | Status: SHIPPED | OUTPATIENT
Start: 2018-11-12 | End: 2018-11-19 | Stop reason: SDUPTHER

## 2018-11-12 RX ORDER — MELOXICAM 7.5 MG/1
TABLET ORAL
Qty: 30 TABLET | Refills: 5 | Status: SHIPPED | OUTPATIENT
Start: 2018-11-12 | End: 2018-11-15 | Stop reason: SDUPTHER

## 2018-11-15 ENCOUNTER — OFFICE VISIT (OUTPATIENT)
Dept: FAMILY MEDICINE CLINIC | Age: 28
End: 2018-11-15
Payer: MEDICAID

## 2018-11-15 ENCOUNTER — HOSPITAL ENCOUNTER (OUTPATIENT)
Dept: OCCUPATIONAL THERAPY | Age: 28
Setting detail: THERAPIES SERIES
Discharge: HOME | End: 2018-11-15
Payer: MEDICAID

## 2018-11-15 VITALS
DIASTOLIC BLOOD PRESSURE: 71 MMHG | HEART RATE: 89 BPM | SYSTOLIC BLOOD PRESSURE: 118 MMHG | WEIGHT: 180.4 LBS | HEIGHT: 63 IN | BODY MASS INDEX: 31.96 KG/M2 | TEMPERATURE: 99.3 F

## 2018-11-15 DIAGNOSIS — K64.4 HEMORRHOIDS, EXTERNAL WITHOUT COMPLICATIONS: ICD-10-CM

## 2018-11-15 DIAGNOSIS — M65.4 DE QUERVAIN'S TENOSYNOVITIS, LEFT: Primary | ICD-10-CM

## 2018-11-15 PROCEDURE — 99213 OFFICE O/P EST LOW 20 MIN: CPT | Performed by: FAMILY MEDICINE

## 2018-11-15 RX ORDER — MELOXICAM 15 MG/1
15 TABLET ORAL DAILY
Qty: 30 TABLET | Refills: 1 | Status: SHIPPED | OUTPATIENT
Start: 2018-11-15 | End: 2019-02-04 | Stop reason: SDUPTHER

## 2018-11-15 RX ORDER — DOCUSATE SODIUM 100 MG/1
100 CAPSULE, LIQUID FILLED ORAL DAILY
Qty: 30 CAPSULE | Refills: 5 | Status: SHIPPED | OUTPATIENT
Start: 2018-11-15 | End: 2019-01-10 | Stop reason: SDUPTHER

## 2018-11-15 ASSESSMENT — ENCOUNTER SYMPTOMS
SHORTNESS OF BREATH: 0
COUGH: 0
GASTROINTESTINAL NEGATIVE: 1

## 2018-11-15 NOTE — PROGRESS NOTES
Visit Information    Have you changed or started any medications since your last visit including any over-the-counter medicines, vitamins, or herbal medicines? no   Have you stopped taking any of your medications? Is so, why? -  no  Are you having any side effects from any of your medications? - no    Have you seen any other physician or provider since your last visit? yes - yissel allergist   Have you had any other diagnostic tests since your last visit?  no   Have you been seen in the emergency room and/or had an admission in a hospital since we last saw you?  yes - xray   Have you had your routine dental cleaning in the past 6 months?  yes -      Do you have an active MyChart account? If no, what is the barrier?   Yes    Patient Care Team:  Yuko Velasquez MD as PCP - General (Family Medicine)    Medical History Review  Past Medical, Family, and Social History reviewed and does not contribute to the patient presenting condition    Health Maintenance   Topic Date Due    Diabetic foot exam  12/30/2001    Diabetic retinal exam  12/30/2001    HIV screen  12/30/2006    Lipid screen  01/08/2019    TSH testing  01/08/2019    Diabetic microalbuminuria test  05/10/2019    A1C test (Diabetic or Prediabetic)  07/09/2019    Cervical cancer screen  05/16/2020    DTaP/Tdap/Td vaccine (2 - Td) 04/07/2027    Flu vaccine  Completed    Pneumococcal med risk  Completed
narrative on file       Objective:      /71 (Site: Right Upper Arm, Position: Sitting, Cuff Size: Medium Adult) Comment: machine  Pulse 89   Temp 99.3 °F (37.4 °C) (Oral)   Ht 5' 2.99\" (1.6 m)   Wt 180 lb 6.4 oz (81.8 kg)   BMI 31.96 kg/m²    BP Readings from Last 3 Encounters:   11/15/18 118/71   08/24/18 119/72   08/16/18 110/73       Physical Exam    General Appearance:  A&Ox3, NAD  Lungs:  Clear to auscultation B/L. Cardiovascular:  NL S1/S2, RRR, no m,g,r.  Skin: Intact, no bruising or bleeding on exposed skin area  Extremities: No cyanosis, clubbing or edema    Assessment:     1. De Quervain's tenosynovitis, left    2. Hemorrhoids, external without complications        Plan:      1. De Quervain's tenosynovitis, left  - Increase Mobic to 15 mg daily and continue occupational therapy. Thumb spica splint daily as needed. - meloxicam (MOBIC) 15 MG tablet; Take 1 tablet by mouth daily  Dispense: 30 tablet; Refill: 1  - Thumb Spica MISC; by Does not apply route Left thumb spica splint  Dispense: 1 each; Refill: 0    2. Hemorrhoids, external without complications  - docusate sodium (COLACE) 100 MG capsule; Take 1 capsule by mouth daily  Dispense: 30 capsule;  Refill: 5      Requested Prescriptions     Signed Prescriptions Disp Refills    meloxicam (MOBIC) 15 MG tablet 30 tablet 1     Sig: Take 1 tablet by mouth daily    docusate sodium (COLACE) 100 MG capsule 30 capsule 5     Sig: Take 1 capsule by mouth daily    Thumb Spica MISC 1 each 0     Sig: by Does not apply route Left thumb spica splint       Medications Discontinued During This Encounter   Medication Reason    atorvastatin (LIPITOR) 20 MG tablet DUPLICATE    TRINTELLIX 10 MG TABS tablet Therapy completed    meloxicam (MOBIC) 7.5 MG tablet REORDER    docusate sodium (COLACE) 100 MG capsule REORDER         Jayleen received counseling on the following healthy behaviors: nutrition, exercise and medication adherence    Patient given educational

## 2018-11-15 NOTE — CONSULTS
[] [address] [facility] floor       [address], [place]         Phone: [phone]       Fax: [phone] [] [address] at [facility] [address] [facility] , [address]  Phone: [phone]  Fax: [phone]       Occupational Therapy Hand & Upper Extremity  Initial Evaluation      Date: 11/15/2018      Patient: Michaela Sanford  : 1990  MRN: [id number]    Physician: : Nicole Machado MD   Insurance: [id number] No precert required for initial eval and visits. Limit 30 visits, not combined per calendar year.  For additional visits precert is required, [phone]   Medical Diagnosis: L wrist pain M25.532   Rehab Codes: Pain in wrist M25.53, parasthesia of skin R20.1   Onset Date:     Next Dr. Yokasta Christianson: TBD    Past Medical History: [  ] Unremarkable  [  ] MI/Heart Problems  [  ] Refer to full medical chart in EPIC  [ x ] Diabetes  [  ] Cancer  [  ] HTN  [  ] Arthritis  [  ] Pacemaker  [  ] Other: asthma, herniated disk, depressive anxiety  Medications:  [ x ] Refer to full medical chart in [facility]  [  ] None  [  ] Other: nac analcystalistine 600 mg bid, venlafaclafine 75 1x day, busparine 10 1 time a day  Allergies:  [  ] Refer to full medical chart in EPIC  [ x ] None  [  ] Other: FUR       Mechanism of Injury: RSI        Precautions:   [x]None [] Fall Risk []WB Status [] Pacemaker []Other:            Involved Extremity:      [x] Left [] Right  Dominant: [] Left [x]Right  Previous Level of Function: I with all activities  Critical Job/Daily Task Description: work ([employer]) Self care  Work Status: [x] Normal [] Restricted [] Off D/T Injury/Condition [] Retired [] Unemployed [] Disabled []Other:  Orthosis:     [x] Currently has [] To be custom fabricated this date []Planned for subsequent visit    Type:      Subjective:  Chief Complaint: random pain, shooting pain,   Pain: Intensity:  0 /10 Location:    Pain Type: [] Constant [] Intermittent   [  ] with pain meds at rest   [] With movement/Resistive activity [] With Sedentary activity    Pain Altered Tx: []Yes []No  Action Taken:    Objective:  Tests/Measurements:  Current Functional Level:  42.5% functionally impaired as measured with the DASH Functional Survey. 0-100 scale, with 0 = no Deficits    COORDINATION  - Fine Motor 11-15-18     Right in seconds Percentile Left in seconds Percentile   9 Hole Peg Test 21.87  10th 26.18 Less than 10th     STRENGTH  11-15-18      RIGHT LEFT    45 50   Lateral pinch 14 14.5   2 point pinch 11 8   3 jaw pinch 11 13       Problems:     [x] Pain          [x] Strength  [x] Function           [x] Coordination    [x] Sensation               Short Term Goals: (   6  Treatments)  1. Decrease Pain: decrease pain to 3/10 with work activities. 2. Increase strength (pounds): increase strength to 63 pounds to be within functional range for age/gender. 3. Increase function:  DASH score will be 30% functionally impaired or less  4. Independent with Home Exercise Program in 3 sessions      Long Term Goals: (  12  Treatments)  1. Pt to tolerate ionto pending doctor approval.   2. Pt to decrease pain to 0/10 with simple grooming tasks. 3. DASH score to be 15 or less    Patient Goals: decrease pain with thumb movement      Treatment Potential: [x]Good []Fair []Poor  Suggested Professional Referral: []Yes [x]No  Domestic Concerns: []Yes [x]No   Barriers to Goal Achievement: []Yes [x] No    Comments/Assessment: Pt positive for Finklesteins test. Pt reports pain felt over the thumb side of the wrist as the main symptom. The pain appears suddenly. Pt reports pain is felt in the wrist and can travel up the forearm. Pt reports the pain is usually worse when the hand and thumb are in use especially true when forcefully grasping objects or twisting the wrist. Pt states pain and swelling make it difficult to move the thumb and wrist at times.  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 Pt advised to change pattern of picking things up at work for decreased pain. Pt could benefit from Iontophoresis for decrease of local tissue inflammation and damage. 2.5 ml, Waveform: DC, Dosage: 40-80 mA min, Intensity: Up to 4-5 mA, Duration: 10-20 mins depending on dosage and intensity. Allergies discussed with pt and pt identifier confirmed by statement of full name and birthdate. By signing this evaluation you are in agreement with this Iontophoresis treatment. Home Program Initiated:   [x  ] Written    [ x ] Verbal    [  ] Demo   Comprehension of Education: [x] Yes [] No [] Needs Review  [x]Plans /[x] Goals, [x]Risks/ [x] Benefits discussed with [x] Patient []Family    Treatment Plan:  Frequency/Duration:     2-3  Times a week, for  12  visits. [x] Therapeutic Exercise 09017 [] Electrical Stim [id number]    [x]Neuro Re-Ed  16499  [] Written Home Program    [] Iontophoresis 78985  [x] Therapeutic Activities 56160  [x] Manual Therapy 45554  [x] Manual Therapy 04456  [x] Ultrasound 17228   [] Hot Pack/Cold Pack 75148  [] Attended Electrical Stim [id number] [x] Massage 95188    [x] Ortho Fit/Train [id number]  [] Ortho Re-Fit/Check  [id number]          Treatment This Date:  [x] Eval        Min. [x]  Low Complexity   []  Moderate Complexity   []  High Complexity  []  Re-Evaluation Min. [x] Therapeutic Exercise         Min.  30  [x] US                         Min. 8  [] Therapeutic Activity            Min.     Modality Flow Sheet:  START STOP Tx Modality     Electrical Stim:     11-15  Ultrasound: _.8__ W/cm2 x __8_ mins  Duty factor: _x_100%  __50%  __33% __20%  Head size: 2  MHz: __1mHz  _x_3mHz  Location: base of thumb L side     Hot Pack:     Cold Pack:          Flow Sheet:  Exercise Reps/Time Weight/Level Comments   US   completed   PROM   completed   Active digit ROM   Planned for next session                         Evaluation Complexity:  History (Personal factors, comorbidities) [x]  0 []  1-2 []  3+   Exam (limitations, restrictions) [x]  1-2 []  3 []  4+   Decision Making [x]  Low []  Moderate []  High   ? [x]  Low Complexity []  Moderate Complexity []  High Complexity     Total Treatment Time: 38     Time In: 0800   Time Out: 0900      Electronically signed by CORNELIA Thomas on 11/15/2018 at 8:16 AM        Physician Signature: _________________________ Date: _______________  By signing above or cosigning this note, I have reviewed this plan of care and certify a need for medically necessary rehabilitation services.      *PLEASE SIGN ABOVE AND FAX BACK ALL PAGES*

## 2018-11-16 NOTE — PROGRESS NOTES
ANGELI.   ENT: THROAT CLEAR. Lora Belts HEARING NORMAL  Neck: NO MASSES. NO ADENOPATHY. THYROID NOT ENLARGED. NO CAROTID BRUIT  Heart: REGULAR. NO MURMUR   Lungs: BREATHING COMFORTABLY. LUNGS CLEAR. NO WHEEZES  Abdomen: SOFT. NO TENDERNESS. NO MASSES LIVER AND SPLEEN NOT PALPABLE  Extremities: NO EDEMA. NO CALF TENDERNESS. NO VARICOSE VEINS. RT BIG TOE MEDIALLY DRY SKIN NOTED. . NO CALLUSES NOTED OVER FEET. Peripheral vascular : PEDAL PULSES GOOD   Rheumatological : NO JOINT SWELLING  Neurological/Memory: ALERT  AND ORIENTED  X 3 ,MONOFILAMENT SENSATIONS NORMAL. REFLEXES NORMAL. Psychiatric:  MOOD AND AFFECT NORMAL  Skin: NO RASHES.        Assessment:   LAB / IMAGING    POCT A1C 11/19/18,  7.2 %    NOT HAD TSH FROM AST ORDER.     POCT A1C  7/9/18 , 6.9%    Urinalysis   Collected:  5/10/2018 14:20    Ref Range & Units 1mo ago   Color, UA YEL DARK YELLOW     Turbidity UA CLEAR CLEAR    Glucose, Ur NEG 3+     Bilirubin Urine NEG NEGATIVE    Ketones, Urine NEG TRACE     Specific Gravity, UA 1.005 - 1.030 1.024    Urine Hgb NEG NEGATIVE    pH, UA 5.0 - 8.0 7.0    Protein, UA NEG NEGATIVE    Urobilinogen, Urine NORM Normal    Nitrite, Urine NEG NEGATIVE    Leukocyte Esterase, Urine NEG NEGATIVE          Microalbumin, Ur   Order: [id number]   Collected:  5/10/2018 14:20    Ref Range & Units 1mo ago   Microalb, Ur <21 mg/L <12    Creatinine, Ur 28.0 - 217.0 mg/dL 69.6            Comprehensive Metabolic Panel   Order: [id number]   Collected:  5/10/2018 14:20    Ref Range & Units 1mo ago   Glucose 70 - 99 mg/dL 342     BUN 6 - 20 mg/dL 16    CREATININE 0.50 - 0.90 mg/dL 0.63    Bun/Cre Ratio 9 - 20 NOT REPORTED    Calcium 8.6 - 10.4 mg/dL 9.1    Sodium 135 - 144 mmol/L 133     Potassium 3.7 - 5.3 mmol/L 4.3    Chloride 98 - 107 mmol/L 98    CO2 20 - 31 mmol/L 22    Anion Gap 9 - 17 mmol/L 13    Alkaline Phosphatase 35 - 104 U/L 93    ALT 5 - 33 U/L 17    AST <32 U/L 20    Total Bilirubin 0.3 - 1.2 mg/dL 0.54    Total Protein 6.4 - 8.3 HYPOTHYROIDISM. Visit date not found        Patient given educational materials - see patient instructions. Discussed use, benefit, and side effects of prescribed medications. All patient questions answered. Pt voiced understanding. Reviewed health maintenance. Instructed to continue current medications, diet and exercise. Patient agreed with treatment plan. Follow up as directed.        Electronically signed by Raffy Cheek MD on 11/19/2018

## 2018-11-19 ENCOUNTER — HOSPITAL ENCOUNTER (OUTPATIENT)
Dept: OCCUPATIONAL THERAPY | Age: 28
Setting detail: THERAPIES SERIES
Discharge: HOME | End: 2018-11-19
Payer: MEDICAID

## 2018-11-19 ENCOUNTER — HOSPITAL ENCOUNTER (OUTPATIENT)
Age: 28
Setting detail: SPECIMEN
Discharge: HOME | End: 2018-11-19
Payer: MEDICAID

## 2018-11-19 ENCOUNTER — OFFICE VISIT (OUTPATIENT)
Dept: ENDOCRINOLOGY | Age: 28
End: 2018-11-19
Payer: MEDICAID

## 2018-11-19 VITALS
HEART RATE: 80 BPM | HEIGHT: 63 IN | DIASTOLIC BLOOD PRESSURE: 64 MMHG | SYSTOLIC BLOOD PRESSURE: 100 MMHG | WEIGHT: 176.4 LBS | OXYGEN SATURATION: 96 % | BODY MASS INDEX: 31.25 KG/M2 | TEMPERATURE: 99.1 F

## 2018-11-19 DIAGNOSIS — E10.9 TYPE 1 DIABETES MELLITUS WITHOUT COMPLICATION (HCC): ICD-10-CM

## 2018-11-19 DIAGNOSIS — E03.9 ACQUIRED HYPOTHYROIDISM: ICD-10-CM

## 2018-11-19 DIAGNOSIS — E10.9 TYPE 1 DIABETES MELLITUS WITHOUT COMPLICATION (HCC): Primary | ICD-10-CM

## 2018-11-19 LAB
HBA1C MFR BLD: 7.2 %
TSH SERPL DL<=0.05 MIU/L-ACNC: 0.03 MIU/L (ref 0.3–5)

## 2018-11-19 PROCEDURE — G8417 CALC BMI ABV UP PARAM F/U: HCPCS | Performed by: INTERNAL MEDICINE

## 2018-11-19 PROCEDURE — 3045F PR MOST RECENT HEMOGLOBIN A1C LEVEL 7.0-9.0%: CPT | Performed by: INTERNAL MEDICINE

## 2018-11-19 PROCEDURE — 1036F TOBACCO NON-USER: CPT | Performed by: INTERNAL MEDICINE

## 2018-11-19 PROCEDURE — 97033 APP MDLTY 1+IONTPHRSIS EA 15: CPT

## 2018-11-19 PROCEDURE — G8484 FLU IMMUNIZE NO ADMIN: HCPCS | Performed by: INTERNAL MEDICINE

## 2018-11-19 PROCEDURE — 2022F DILAT RTA XM EVC RTNOPTHY: CPT | Performed by: INTERNAL MEDICINE

## 2018-11-19 PROCEDURE — 99214 OFFICE O/P EST MOD 30 MIN: CPT | Performed by: INTERNAL MEDICINE

## 2018-11-19 PROCEDURE — 97140 MANUAL THERAPY 1/> REGIONS: CPT

## 2018-11-19 PROCEDURE — 83036 HEMOGLOBIN GLYCOSYLATED A1C: CPT | Performed by: INTERNAL MEDICINE

## 2018-11-19 PROCEDURE — 97035 APP MDLTY 1+ULTRASOUND EA 15: CPT

## 2018-11-19 PROCEDURE — 97110 THERAPEUTIC EXERCISES: CPT

## 2018-11-19 PROCEDURE — G8427 DOCREV CUR MEDS BY ELIG CLIN: HCPCS | Performed by: INTERNAL MEDICINE

## 2018-11-19 RX ORDER — LEVOTHYROXINE SODIUM 0.15 MG/1
150 TABLET ORAL DAILY
Qty: 30 TABLET | Refills: 8 | Status: SHIPPED | OUTPATIENT
Start: 2018-11-19 | End: 2018-11-24 | Stop reason: SDUPTHER

## 2018-11-19 NOTE — PROGRESS NOTES
Chronic Disease Visit Information    BP Readings from Last 3 Encounters:   11/15/18 118/71   08/24/18 119/72   08/16/18 110/73          Hemoglobin A1C (%)   Date Value   07/09/2018 6.9   01/08/2018 7.8   08/28/2017 8.9     Microalb/Crt. Ratio (mcg/mg creat)   Date Value   05/10/2018 CANNOT BE CALCULATED     LDL Cholesterol (mg/dL)   Date Value   01/08/2018 74     HDL (mg/dL)   Date Value   01/08/2018 91     BUN (mg/dL)   Date Value   05/10/2018 16     CREATININE (mg/dL)   Date Value   05/10/2018 0.63     Glucose (mg/dL)   Date Value   05/10/2018 342 (H)            Have you changed or started any medications since your last visit including any over-the-counter medicines, vitamins, or herbal medicines? no   Are you having any side effects from any of your medications? -  no  Have you stopped taking any of your medications? Is so, why? -  no    Have you seen any other physician or provider since your last visit? Yes - Records Obtained  Have you had any other diagnostic tests since your last visit? Yes - Records Obtained  Have you been seen in the emergency room and/or had an admission to a hospital since we last saw you? No  Have you had your annual diabetic retinal (eye) exam? No  Have you had your routine dental cleaning in the past 6 months? no    Have you activated your [employer] account? If not, what are your barriers?  Yes     Patient Care Team:  Bonita Stevens MD as PCP - General (Family Medicine)         Medical History Review  Past Medical, Family, and Social History reviewed and does contribute to the patient presenting condition    Health Maintenance   Topic Date Due    Diabetic foot exam  12/30/2001    Diabetic retinal exam  12/30/2001    HIV screen  12/30/2006    Lipid screen  01/08/2019    TSH testing  01/08/2019    Diabetic microalbuminuria test  05/10/2019    A1C test (Diabetic or Prediabetic)  07/09/2019    Cervical cancer screen  05/16/2020    DTaP/Tdap/Td vaccine (2 - Td) 04/07/2027    Flu vaccine  Completed    Pneumococcal med risk  Completed

## 2018-11-21 ENCOUNTER — HOSPITAL ENCOUNTER (OUTPATIENT)
Dept: OCCUPATIONAL THERAPY | Age: 28
Setting detail: THERAPIES SERIES
Discharge: HOME | End: 2018-11-21
Payer: MEDICAID

## 2018-11-21 ENCOUNTER — TELEPHONE (OUTPATIENT)
Dept: ENDOCRINOLOGY | Age: 28
End: 2018-11-21

## 2018-11-21 PROCEDURE — 97035 APP MDLTY 1+ULTRASOUND EA 15: CPT

## 2018-11-21 PROCEDURE — 97033 APP MDLTY 1+IONTPHRSIS EA 15: CPT

## 2018-11-21 PROCEDURE — 97140 MANUAL THERAPY 1/> REGIONS: CPT

## 2018-11-24 RX ORDER — LEVOTHYROXINE SODIUM 0.15 MG/1
TABLET ORAL
Qty: 15 TABLET | Refills: 5 | Status: SHIPPED | OUTPATIENT
Start: 2018-11-24 | End: 2019-06-04 | Stop reason: SDUPTHER

## 2018-11-24 RX ORDER — LEVOTHYROXINE SODIUM 0.12 MG/1
TABLET ORAL
Qty: 30 TABLET | Refills: 0 | Status: SHIPPED | OUTPATIENT
Start: 2018-11-24 | End: 2019-02-27 | Stop reason: SDUPTHER

## 2018-11-27 ENCOUNTER — HOSPITAL ENCOUNTER (OUTPATIENT)
Dept: OCCUPATIONAL THERAPY | Age: 28
Setting detail: THERAPIES SERIES
Discharge: HOME | End: 2018-11-27
Payer: MEDICAID

## 2018-11-27 PROCEDURE — 97035 APP MDLTY 1+ULTRASOUND EA 15: CPT

## 2018-11-27 PROCEDURE — 97140 MANUAL THERAPY 1/> REGIONS: CPT

## 2018-11-27 PROCEDURE — 97033 APP MDLTY 1+IONTPHRSIS EA 15: CPT

## 2018-11-29 ENCOUNTER — HOSPITAL ENCOUNTER (OUTPATIENT)
Dept: OCCUPATIONAL THERAPY | Age: 28
Setting detail: THERAPIES SERIES
Discharge: HOME | End: 2018-11-29
Payer: MEDICAID

## 2018-11-29 PROCEDURE — 97033 APP MDLTY 1+IONTPHRSIS EA 15: CPT

## 2018-11-29 PROCEDURE — 97035 APP MDLTY 1+ULTRASOUND EA 15: CPT

## 2018-11-29 PROCEDURE — 97140 MANUAL THERAPY 1/> REGIONS: CPT

## 2018-11-29 NOTE — FLOWSHEET NOTE
[x] [address] [facility] floor       [address], [place]         Phone: [phone]       Fax: [phone] [] [address] [facility] at [address] [facility] , [address] [place] [address]  Phone: [phone]  Fax: [phone]     Occupational Therapy Daily Treatment Note    Date:  2018  Patient Name:  Dana Pedroza    :  1990  MRN: [id number]  Visit# / total visits:  Cancels/No Shows: 0/0      Subjective:    Pain:  [] Yes  [x] No Location:          Pain Rating: (0-10 scale) 0/10  Pain altered Tx:  [x] No  [] Yes  Action:  Comments:    Objective:  Modality Flow Sheet:  START STOP Tx Modality     Electrical Stim:     18  Ultrasound: __.8_ W/cm2 x _8__ mins  Duty factor: x100%  __50%  __33% __20%  Head size: 2  MHz: __1mHz  _x_3mHz  Location: base of thumb     Hot Pack:     Cold Pack:     Exercises:    EXERCISE    REPS/     TIME  WEIGHT/    LEVEL COMMENTS   US      ionto   See comments   Scar massage                                Other:Pt completed US and Iontophoresis this date with no noted pain or skin issues. Iontophoresis for decrease of local tissue inflammation and damage. Waveform: DC  Dosage: 40-80 mA min  (40)  Intensity: Up to 4-5 mA  (2.3)  Duration: 10-20 mins depending on dosage and intensity (16)  Allergies discussed with pt and pt identifier confirmed by statement of full name and birthdate. Specific Instructions for next treatment:    Treatment Charges: Mins Units   [x]  Modalities  ionto  US   16  8   1  1   []  Ther Exercise     [x]  Manual Therapy 23 2   []  Ther Activities     []  Other         Assessment: [x] Progressing toward goals. [] No change. [] Other:    Short Term Goals: (   6  Treatments)  1. Decrease Pain: decrease pain to 3/10 with work activities. 2. Increase strength (pounds): increase strength to 63 pounds to be within functional range for age/gender.    3. Increase function:

## 2018-12-04 ENCOUNTER — HOSPITAL ENCOUNTER (OUTPATIENT)
Dept: OCCUPATIONAL THERAPY | Age: 28
Setting detail: THERAPIES SERIES
Discharge: HOME | End: 2018-12-04
Payer: MEDICAID

## 2018-12-04 PROCEDURE — 97033 APP MDLTY 1+IONTPHRSIS EA 15: CPT

## 2018-12-04 PROCEDURE — 97140 MANUAL THERAPY 1/> REGIONS: CPT

## 2018-12-04 NOTE — PROGRESS NOTES
thumb L side     Hot Pack:     Cold Pack:          Flow Sheet:  Exercise Reps/Time Weight/Level Comments   US   completed   PROM   completed   Active digit ROM      Iontophoresis for decrease of local tissue inflammation and damage. Waveform: DC  Dosage: 40-80 mA min  Intensity: Up to 4-5 mA  Duration: 10-20 mins depending on dosage and intensity    Allergies discussed with pt and pt identifier confirmed by statement of full name and birthdate. Total Treatment Time: 55     Time In: 7550-6435      Electronically signed by CORNELIA Adrian on 12/4/2018 at 1:10 PM        Physician Signature: _________________________ Date: _______________  By signing above or cosigning this note, I have reviewed this plan of care and certify a need for medically necessary rehabilitation services.      *PLEASE SIGN ABOVE AND FAX BACK ALL PAGES*

## 2018-12-06 ENCOUNTER — HOSPITAL ENCOUNTER (OUTPATIENT)
Dept: OCCUPATIONAL THERAPY | Age: 28
Setting detail: THERAPIES SERIES
Discharge: HOME | End: 2018-12-06
Payer: MEDICAID

## 2018-12-06 PROCEDURE — 97140 MANUAL THERAPY 1/> REGIONS: CPT

## 2018-12-06 PROCEDURE — 97035 APP MDLTY 1+ULTRASOUND EA 15: CPT

## 2018-12-06 PROCEDURE — 97033 APP MDLTY 1+IONTPHRSIS EA 15: CPT

## 2018-12-06 NOTE — FLOWSHEET NOTE
[x] [address] [facility] floor       [address], [place]         Phone: [phone]       Fax: [phone] [] [address] [facility] at [address] [facility] , [address] [facility]  Phone: [phone]  Fax: [phone]     Occupational Therapy Daily Treatment Note    Date:  2018  Patient Name:  Jose Luis Conde    :  1990  MRN: [id number]  Visit# / total visits:  Cancels/No Shows: 0/0      Subjective:    Pain:  [] Yes  [x] No Location:          Pain Rating: (0-10 scale) 0/10  Pain altered Tx:  [x] No  [] Yes  Action:  Comments:    Objective:  Modality Flow Sheet:  START STOP Tx Modality     Electrical Stim:     18  Ultrasound: __.8_ W/cm2 x _8__ mins  Duty factor: x100%  __50%  __33% __20%  Head size: 2  MHz: __1mHz  _x_3mHz  Location: base of thumb     Hot Pack:     Cold Pack:     Exercises:    EXERCISE    REPS/     TIME  WEIGHT/    LEVEL COMMENTS   US      ionto   See comments   Scar massage                                Other: Pt completed US and Iontophoresis this date with no noted pain or skin issues. Iontophoresis for decrease of local tissue inflammation and damage. Pt completed last of the 6 iontophoresis visits this date. Pt aware and agreeable. Waveform: DC  Dosage: 40-80 mA min  (40)  Intensity: Up to 4-5 mA  (2.3)  Duration: 10-20 mins depending on dosage and intensity (16)  Allergies discussed with pt and pt identifier confirmed by statement of full name and birthdate. Specific Instructions for next treatment:    Treatment Charges: Mins Units   [x]  Modalities  ionto  US   16  8   1  1   []  Ther Exercise     [x]  Manual Therapy 23 2   []  Ther Activities     []  Other         Assessment: [x] Progressing toward goals. [] No change. [] Other:    Short Term Goals: (   6  Treatments)  1. Decrease Pain: decrease pain to 3/10 with work activities.   2. Increase strength (pounds): increase strength to 63 pounds to be within functional range for age/gender. 3. Increase function:  DASH score will be 30% functionally impaired or less  4. Independent with Home Exercise Program in 3 sessions        Long Term Goals: (  12  Treatments)  1. Pt to tolerate ionto pending doctor approval.   2. Pt to decrease pain to 0/10 with simple grooming tasks. 3. DASH score to be 15 or less  4. Pt. Education:  [x] Yes  [] No  [] Reviewed Prior HEP/Ed  Method of Education: [x] Verbal  [] Demo  [] Written  Comprehension of Education:  [x] Verbalizes understanding. [] Demonstrates understanding. [] Needs review. [] Demonstrates/verbalizes HEP/Ed previously given.       Plan:  Continue with current plan         Time In/Out: 5595-7105   Total Treatment Time:  61        Min      Electronically signed by:  ANISH Adrian/LEANDRO

## 2018-12-11 ENCOUNTER — HOSPITAL ENCOUNTER (OUTPATIENT)
Dept: OCCUPATIONAL THERAPY | Age: 28
Setting detail: THERAPIES SERIES
Discharge: HOME | End: 2018-12-11
Payer: MEDICAID

## 2018-12-13 ENCOUNTER — HOSPITAL ENCOUNTER (OUTPATIENT)
Dept: OCCUPATIONAL THERAPY | Age: 28
Setting detail: THERAPIES SERIES
Discharge: HOME | End: 2018-12-13
Payer: MEDICAID

## 2018-12-13 PROCEDURE — 97110 THERAPEUTIC EXERCISES: CPT

## 2018-12-13 PROCEDURE — 97035 APP MDLTY 1+ULTRASOUND EA 15: CPT

## 2018-12-13 NOTE — FLOWSHEET NOTE
[x] [address] [facility] floor       [address], [place]         Phone: [phone]       Fax: [phone] [] [address] [facility] at [address] [facility] , [address] [facility]  Phone: [phone]  Fax: [phone]     Occupational Therapy Daily Treatment Note    Date:  2018  Patient Name:  Francisca Bell    :  1990  MRN: [id number]  Visit# / total visits:  Cancels/No Shows: 0/0      Subjective:    Pain:  [] Yes  [x] No Location:          Pain Rating: (0-10 scale) 0/10  Pain altered Tx:  [x] No  [] Yes  Action:  Comments:    Objective:  Modality Flow Sheet:  START STOP Tx Modality     Electrical Stim:     18  Ultrasound: __.8_ W/cm2 x _8__ mins  Duty factor: x100%  __50%  __33% __20%  Head size: 2  MHz: __1mHz  _x_3mHz  Location: base of thumb     Hot Pack:     Cold Pack:     Exercises:    EXERCISE    REPS/     TIME  WEIGHT/    LEVEL COMMENTS   US      ionto   See comments   Scar massage      Theraputty 10 X light peach Initiated this date                       Other: Pt completed US  date with no noted pain or skin issues. Pt has completed last of the 6 iontophoresis visits last visit. Pt aware and agreeable. Theraputty introduced this date with good return. Issued HEP with theraputty and issued putty precautions with peach (x light) putty for home use. Specific Instructions for next treatment:    Treatment Charges: Mins Units   [x]  Modalities  US   8   1   []  Ther Exercise     [x]  Manual Therapy 38 3   []  Ther Activities     []  Other         Assessment: [x] Progressing toward goals. [] No change. [] Other:    Short Term Goals: (   6  Treatments)  1. Decrease Pain: decrease pain to 3/10 with work activities. 2. Increase strength (pounds): increase strength to 63 pounds to be within functional range for age/gender.    3. Increase function:  DASH score will be 30% functionally impaired or

## 2018-12-18 ENCOUNTER — HOSPITAL ENCOUNTER (OUTPATIENT)
Dept: OCCUPATIONAL THERAPY | Age: 28
Setting detail: THERAPIES SERIES
Discharge: HOME | End: 2018-12-18
Payer: MEDICAID

## 2018-12-20 ENCOUNTER — HOSPITAL ENCOUNTER (OUTPATIENT)
Dept: OCCUPATIONAL THERAPY | Age: 28
Setting detail: THERAPIES SERIES
Discharge: HOME | End: 2018-12-20
Payer: MEDICAID

## 2019-01-10 ENCOUNTER — OFFICE VISIT (OUTPATIENT)
Dept: FAMILY MEDICINE CLINIC | Age: 29
End: 2019-01-10
Payer: MEDICAID

## 2019-01-10 ENCOUNTER — TELEPHONE (OUTPATIENT)
Dept: PHARMACY | Age: 29
End: 2019-01-10

## 2019-01-10 VITALS
WEIGHT: 176 LBS | SYSTOLIC BLOOD PRESSURE: 118 MMHG | BODY MASS INDEX: 31.18 KG/M2 | HEART RATE: 90 BPM | HEIGHT: 63 IN | DIASTOLIC BLOOD PRESSURE: 73 MMHG | TEMPERATURE: 98.8 F

## 2019-01-10 DIAGNOSIS — M65.4 DE QUERVAIN'S TENOSYNOVITIS, LEFT: Primary | ICD-10-CM

## 2019-01-10 DIAGNOSIS — Z11.4 ENCOUNTER FOR SCREENING FOR HIV: ICD-10-CM

## 2019-01-10 DIAGNOSIS — Z76.0 MEDICATION REFILL: ICD-10-CM

## 2019-01-10 DIAGNOSIS — K59.00 CONSTIPATION, UNSPECIFIED CONSTIPATION TYPE: ICD-10-CM

## 2019-01-10 PROCEDURE — G8484 FLU IMMUNIZE NO ADMIN: HCPCS | Performed by: FAMILY MEDICINE

## 2019-01-10 PROCEDURE — G8427 DOCREV CUR MEDS BY ELIG CLIN: HCPCS | Performed by: FAMILY MEDICINE

## 2019-01-10 PROCEDURE — 1036F TOBACCO NON-USER: CPT | Performed by: FAMILY MEDICINE

## 2019-01-10 PROCEDURE — G8417 CALC BMI ABV UP PARAM F/U: HCPCS | Performed by: FAMILY MEDICINE

## 2019-01-10 PROCEDURE — 99213 OFFICE O/P EST LOW 20 MIN: CPT | Performed by: FAMILY MEDICINE

## 2019-01-10 RX ORDER — BLOOD SUGAR DIAGNOSTIC
STRIP MISCELLANEOUS
Qty: 100 EACH | Refills: 2 | Status: SHIPPED | OUTPATIENT
Start: 2019-01-10 | End: 2019-07-24 | Stop reason: SDUPTHER

## 2019-01-10 RX ORDER — DOCUSATE SODIUM 100 MG/1
100 CAPSULE, LIQUID FILLED ORAL 2 TIMES DAILY PRN
Qty: 60 CAPSULE | Refills: 1 | Status: SHIPPED | OUTPATIENT
Start: 2019-01-10 | End: 2019-04-12 | Stop reason: SDUPTHER

## 2019-01-10 ASSESSMENT — ENCOUNTER SYMPTOMS
SORE THROAT: 0
DIARRHEA: 0
COUGH: 0
NAUSEA: 0
VOMITING: 0
SHORTNESS OF BREATH: 0
BACK PAIN: 0
ABDOMINAL PAIN: 0
CONSTIPATION: 0

## 2019-01-11 RX ORDER — LORATADINE 10 MG/1
10 TABLET ORAL DAILY
COMMUNITY
End: 2020-07-27 | Stop reason: SDUPTHER

## 2019-01-11 RX ORDER — VENLAFAXINE HYDROCHLORIDE 75 MG/1
125 CAPSULE, EXTENDED RELEASE ORAL DAILY
COMMUNITY
End: 2022-02-17

## 2019-01-11 RX ORDER — LAMOTRIGINE 100 MG/1
100 TABLET ORAL DAILY
COMMUNITY

## 2019-01-18 RX ORDER — BLOOD SUGAR DIAGNOSTIC
STRIP MISCELLANEOUS
Qty: 100 STRIP | Refills: 11 | Status: SHIPPED | OUTPATIENT
Start: 2019-01-18 | End: 2019-07-24 | Stop reason: SDUPTHER

## 2019-01-22 DIAGNOSIS — E10.9 TYPE 1 DIABETES MELLITUS WITHOUT COMPLICATION (HCC): ICD-10-CM

## 2019-01-23 RX ORDER — INSULIN GLARGINE 100 [IU]/ML
INJECTION, SOLUTION SUBCUTANEOUS
Qty: 3 ML | Refills: 9 | Status: SHIPPED | OUTPATIENT
Start: 2019-01-23 | End: 2020-10-21 | Stop reason: ALTCHOICE

## 2019-01-23 RX ORDER — ATORVASTATIN CALCIUM 20 MG/1
TABLET, FILM COATED ORAL
Qty: 30 TABLET | Refills: 3 | Status: SHIPPED | OUTPATIENT
Start: 2019-01-23 | End: 2019-06-04 | Stop reason: SDUPTHER

## 2019-02-04 DIAGNOSIS — M65.4 DE QUERVAIN'S TENOSYNOVITIS, LEFT: ICD-10-CM

## 2019-02-04 RX ORDER — MELOXICAM 15 MG/1
15 TABLET ORAL DAILY
Qty: 30 TABLET | Refills: 1 | Status: SHIPPED | OUTPATIENT
Start: 2019-02-04 | End: 2019-07-17

## 2019-02-20 ENCOUNTER — OFFICE VISIT (OUTPATIENT)
Dept: ENDOCRINOLOGY | Age: 29
End: 2019-02-20
Payer: MEDICAID

## 2019-02-20 VITALS
DIASTOLIC BLOOD PRESSURE: 60 MMHG | BODY MASS INDEX: 31.25 KG/M2 | OXYGEN SATURATION: 96 % | HEIGHT: 63 IN | HEART RATE: 78 BPM | WEIGHT: 176.4 LBS | TEMPERATURE: 98.2 F | SYSTOLIC BLOOD PRESSURE: 128 MMHG

## 2019-02-20 DIAGNOSIS — E10.9 TYPE 1 DIABETES MELLITUS WITHOUT COMPLICATION (HCC): Primary | ICD-10-CM

## 2019-02-20 DIAGNOSIS — E03.9 ACQUIRED HYPOTHYROIDISM: ICD-10-CM

## 2019-02-20 LAB — HBA1C MFR BLD: 6.7 %

## 2019-02-20 PROCEDURE — 2022F DILAT RTA XM EVC RTNOPTHY: CPT | Performed by: INTERNAL MEDICINE

## 2019-02-20 PROCEDURE — 99214 OFFICE O/P EST MOD 30 MIN: CPT | Performed by: INTERNAL MEDICINE

## 2019-02-20 PROCEDURE — G8417 CALC BMI ABV UP PARAM F/U: HCPCS | Performed by: INTERNAL MEDICINE

## 2019-02-20 PROCEDURE — 83036 HEMOGLOBIN GLYCOSYLATED A1C: CPT | Performed by: INTERNAL MEDICINE

## 2019-02-20 PROCEDURE — 3044F HG A1C LEVEL LT 7.0%: CPT | Performed by: INTERNAL MEDICINE

## 2019-02-20 PROCEDURE — G8427 DOCREV CUR MEDS BY ELIG CLIN: HCPCS | Performed by: INTERNAL MEDICINE

## 2019-02-20 PROCEDURE — G8484 FLU IMMUNIZE NO ADMIN: HCPCS | Performed by: INTERNAL MEDICINE

## 2019-02-20 PROCEDURE — 1036F TOBACCO NON-USER: CPT | Performed by: INTERNAL MEDICINE

## 2019-02-20 RX ORDER — ACETYLCYSTEINE 600 MG
CAPSULE ORAL
Refills: 1 | COMMUNITY
Start: 2019-02-06 | End: 2022-02-17 | Stop reason: ALTCHOICE

## 2019-02-20 RX ORDER — SELENIUM 200 MCG
TABLET ORAL
COMMUNITY
End: 2019-07-17

## 2019-02-27 RX ORDER — LEVOTHYROXINE SODIUM 0.12 MG/1
TABLET ORAL
Qty: 90 TABLET | Refills: 0 | Status: SHIPPED | OUTPATIENT
Start: 2019-02-27 | End: 2019-07-13 | Stop reason: SDUPTHER

## 2019-03-28 ENCOUNTER — HOSPITAL ENCOUNTER (OUTPATIENT)
Age: 29
Setting detail: SPECIMEN
Discharge: HOME | End: 2019-03-28
Payer: MEDICAID

## 2019-03-28 ENCOUNTER — OFFICE VISIT (OUTPATIENT)
Dept: FAMILY MEDICINE CLINIC | Age: 29
End: 2019-03-28
Payer: MEDICAID

## 2019-03-28 VITALS
HEIGHT: 63 IN | BODY MASS INDEX: 31.29 KG/M2 | WEIGHT: 176.6 LBS | TEMPERATURE: 98 F | SYSTOLIC BLOOD PRESSURE: 128 MMHG | HEART RATE: 77 BPM | DIASTOLIC BLOOD PRESSURE: 71 MMHG

## 2019-03-28 DIAGNOSIS — Z11.4 ENCOUNTER FOR SCREENING FOR HIV: ICD-10-CM

## 2019-03-28 DIAGNOSIS — M65.4 RADIAL STYLOID TENOSYNOVITIS (DE QUERVAIN): Primary | ICD-10-CM

## 2019-03-28 PROCEDURE — 99213 OFFICE O/P EST LOW 20 MIN: CPT | Performed by: FAMILY MEDICINE

## 2019-03-28 PROCEDURE — 1036F TOBACCO NON-USER: CPT | Performed by: FAMILY MEDICINE

## 2019-03-28 PROCEDURE — G8417 CALC BMI ABV UP PARAM F/U: HCPCS | Performed by: FAMILY MEDICINE

## 2019-03-28 PROCEDURE — G8427 DOCREV CUR MEDS BY ELIG CLIN: HCPCS | Performed by: FAMILY MEDICINE

## 2019-03-28 PROCEDURE — 99211 OFF/OP EST MAY X REQ PHY/QHP: CPT | Performed by: FAMILY MEDICINE

## 2019-03-28 PROCEDURE — G8484 FLU IMMUNIZE NO ADMIN: HCPCS | Performed by: FAMILY MEDICINE

## 2019-03-28 RX ORDER — IBUPROFEN 800 MG/1
800 TABLET ORAL
Qty: 60 TABLET | Refills: 0 | Status: SHIPPED | OUTPATIENT
Start: 2019-03-28 | End: 2020-12-16

## 2019-03-28 ASSESSMENT — ENCOUNTER SYMPTOMS
COUGH: 0
SHORTNESS OF BREATH: 0

## 2019-03-29 LAB — HIV AG/AB: NONREACTIVE

## 2019-04-12 DIAGNOSIS — K59.00 CONSTIPATION, UNSPECIFIED CONSTIPATION TYPE: ICD-10-CM

## 2019-04-12 NOTE — TELEPHONE ENCOUNTER
Last visit:   Last Med refill: 3-13-19  Does patient have enough medication for 72 hours: Yes    Next Visit Date:  Future Appointments   Date Time Provider Georgina Nguyen   5/9/2019  1:30 PM DO Kelli Smith [facility]   5/22/2019 12:30 PM Evelin Ibarra MD [address] Maintenance   Topic Date Due    Varicella Vaccine (1 of 2 - 13+ 2-dose series) 12/30/2004    Hepatitis B Vaccine (1 of 3 - Risk 3-dose series) 12/30/2010    Lipid screen  01/08/2019    Diabetic foot exam  01/22/2020 (Originally 12/30/2001)    Diabetic retinal exam  01/22/2020 (Originally 12/30/2001)    Diabetic microalbuminuria test  05/10/2019    TSH testing  11/19/2019    A1C test (Diabetic or Prediabetic)  02/20/2020    Cervical cancer screen  05/16/2020    DTaP/Tdap/Td vaccine (2 - Td) 04/07/2027    Flu vaccine  Completed    Pneumococcal 0-64 years Vaccine  Completed    HIV screen  Completed    HPV vaccine  Aged Out       Hemoglobin A1C (%)   Date Value   02/20/2019 6.7   11/19/2018 7.2   07/09/2018 6.9             ( goal A1C is < 7)   Microalb/Crt. Ratio (mcg/mg creat)   Date Value   05/10/2018 CANNOT BE CALCULATED     LDL Cholesterol (mg/dL)   Date Value   01/08/2018 74       (goal LDL is <100)   AST (U/L)   Date Value   05/10/2018 20     ALT (U/L)   Date Value   05/10/2018 17     BUN (mg/dL)   Date Value   05/10/2018 16     BP Readings from Last 3 Encounters:   03/28/19 128/71   02/20/19 128/60   01/10/19 118/73          (goal 120/80)    All Future Testing planned in [facility]  Lab Frequency Next Occurrence   TSH Once 02/20/2019   T4, Free Once 03/22/2019               Patient Active Problem List:     Type 1 diabetes mellitus without complication (HCC)     Depression     Hypothyroidism     Oligomenorrhea     Abnormal uterine bleeding     LSIL w/ (-) HPV cotesting     Mirena IUD 6/27/17     Yeast infection of the skin         Please address the medication refill and close the encounter.   If I can be of assistance, please route to the applicable pool. Thank you.

## 2019-04-15 ENCOUNTER — TELEPHONE (OUTPATIENT)
Dept: FAMILY MEDICINE CLINIC | Age: 29
End: 2019-04-15

## 2019-04-15 DIAGNOSIS — M65.4 RADIAL STYLOID TENOSYNOVITIS (DE QUERVAIN): ICD-10-CM

## 2019-04-15 RX ORDER — DOCUSATE SODIUM 100 MG/1
CAPSULE ORAL
Qty: 60 CAPSULE | Refills: 1 | Status: SHIPPED | OUTPATIENT
Start: 2019-04-15 | End: 2019-07-13 | Stop reason: SDUPTHER

## 2019-04-15 NOTE — TELEPHONE ENCOUNTER
Ashlyn Mattson from [place] [address] is calling, they received orders for a thumb spia cast on 3/28/2019, however the orders are signed by a resident. They are requesting the orders to be printed and co-signed by an attending physician and re-faxed. Fax # [phone]  Phone # [phone].

## 2019-04-15 NOTE — TELEPHONE ENCOUNTER
Claire Mishra is there anyway on Thursday when you come in can you sign the order for for Thumb Spia Cast , I pended the order.

## 2019-05-09 ENCOUNTER — OFFICE VISIT (OUTPATIENT)
Dept: FAMILY MEDICINE CLINIC | Age: 29
End: 2019-05-09
Payer: MEDICAID

## 2019-05-09 VITALS
BODY MASS INDEX: 31 KG/M2 | DIASTOLIC BLOOD PRESSURE: 66 MMHG | SYSTOLIC BLOOD PRESSURE: 115 MMHG | WEIGHT: 175 LBS | HEART RATE: 72 BPM

## 2019-05-09 DIAGNOSIS — M65.4 DE QUERVAIN'S TENOSYNOVITIS, RIGHT: Primary | ICD-10-CM

## 2019-05-09 PROCEDURE — 6360000002 HC RX W HCPCS

## 2019-05-09 PROCEDURE — G8417 CALC BMI ABV UP PARAM F/U: HCPCS | Performed by: FAMILY MEDICINE

## 2019-05-09 PROCEDURE — G8427 DOCREV CUR MEDS BY ELIG CLIN: HCPCS | Performed by: FAMILY MEDICINE

## 2019-05-09 PROCEDURE — 99213 OFFICE O/P EST LOW 20 MIN: CPT | Performed by: FAMILY MEDICINE

## 2019-05-09 PROCEDURE — 1036F TOBACCO NON-USER: CPT | Performed by: FAMILY MEDICINE

## 2019-05-09 PROCEDURE — 20605 DRAIN/INJ JOINT/BURSA W/O US: CPT | Performed by: FAMILY MEDICINE

## 2019-05-09 RX ORDER — BETAMETHASONE SOD PHOSPH-WATER 6 MG/ML
6 VIAL (ML) INJECTION ONCE
Status: DISCONTINUED | OUTPATIENT
Start: 2019-05-09 | End: 2019-05-09

## 2019-05-09 RX ORDER — BUPIVACAINE HYDROCHLORIDE 5 MG/ML
INJECTION, SOLUTION EPIDURAL; INTRACAUDAL ONCE
Status: CANCELLED | OUTPATIENT
Start: 2019-05-09 | End: 2019-05-09

## 2019-05-09 RX ORDER — BETAMETHASONE SODIUM PHOSPHATE AND BETAMETHASONE ACETATE 3; 3 MG/ML; MG/ML
INJECTION, SUSPENSION INTRA-ARTICULAR; INTRALESIONAL; INTRAMUSCULAR; SOFT TISSUE ONCE
Status: CANCELLED | OUTPATIENT
Start: 2019-05-09 | End: 2019-05-09

## 2019-05-09 RX ORDER — BUPIVACAINE HYDROCHLORIDE 5 MG/ML
1 INJECTION, SOLUTION PERINEURAL ONCE
Status: COMPLETED | OUTPATIENT
Start: 2019-05-09 | End: 2019-05-09

## 2019-05-09 RX ORDER — BETAMETHASONE SODIUM PHOSPHATE AND BETAMETHASONE ACETATE 3; 3 MG/ML; MG/ML
6 INJECTION, SUSPENSION INTRA-ARTICULAR; INTRALESIONAL; INTRAMUSCULAR; SOFT TISSUE ONCE
Status: COMPLETED | OUTPATIENT
Start: 2019-05-09 | End: 2019-05-09

## 2019-05-09 RX ADMIN — BETAMETHASONE SODIUM PHOSPHATE AND BETAMETHASONE ACETATE 6 MG: 3; 3 INJECTION, SUSPENSION INTRA-ARTICULAR; INTRALESIONAL; INTRAMUSCULAR; SOFT TISSUE at 15:27

## 2019-05-09 RX ADMIN — BUPIVACAINE HYDROCHLORIDE 5 MG: 5 INJECTION, SOLUTION PERINEURAL at 15:11

## 2019-05-09 NOTE — PROGRESS NOTES
Visit Information    Have you changed or started any medications since your last visit including any over-the-counter medicines, vitamins, or herbal medicines? no   Have you stopped taking any of your medications? Is so, why? -  no  Are you having any side effects from any of your medications? - no    Have you seen any other physician or provider since your last visit?  no   Have you had any other diagnostic tests since your last visit?  no   Have you been seen in the emergency room and/or had an admission in a hospital since we last saw you?  no   Have you had your routine dental cleaning in the past 6 months?  no     Do you have an active MyChart account? If no, what is the barrier?   Yes    Patient Care Team:  Bryn aBrreto MD as PCP - General (Family Medicine)    Medical History Review  Past Medical, Family, and Social History reviewed and does contribute to the patient presenting condition    Health Maintenance   Topic Date Due    Varicella Vaccine (1 of 2 - 13+ 2-dose series) 12/30/2004    Hepatitis B Vaccine (1 of 3 - Risk 3-dose series) 12/30/2010    Lipid screen  01/08/2019    Diabetic microalbuminuria test  05/10/2019    Diabetic foot exam  01/22/2020 (Originally 12/30/2001)    Diabetic retinal exam  01/22/2020 (Originally 12/30/2001)    TSH testing  11/19/2019    A1C test (Diabetic or Prediabetic)  02/20/2020    Cervical cancer screen  05/16/2020    DTaP/Tdap/Td vaccine (2 - Td) 04/07/2027    Flu vaccine  Completed    Pneumococcal 0-64 years Vaccine  Completed    HIV screen  Completed    HPV vaccine  Aged Out

## 2019-05-09 NOTE — PROGRESS NOTES
lamoTRIgine (LAMICTAL) 100 MG tablet Take 100 mg by mouth daily      loratadine (CLARITIN) 10 MG tablet Take 10 mg by mouth daily      Insulin Syringe-Needle U-100 ([employer] INSULIN SYR 0.3CC/30G) 30G X 5/16\" 0.3 ML MISC Use as directed 100 each 2    levothyroxine (SYNTHROID) 150 MCG tablet ONE TAB ON EVEN DAYS 15 tablet 5    insulin lispro (ADMELOG) 100 UNIT/ML injection vial TID BEFORE MEALS 1UNIT PER 5 GM CARB  AT  BREAKFAST AND LUNCH AND 10 G CARB FOR DINNER . CORRECTION 1 UNIT FOR 30 POINTS ABOVE 100. ABOUT 70 UNITS DAILY 2 vial 8    Thumb Spica MISC by Does not apply route Left thumb spica splint 1 each 0    albuterol sulfate HFA (VENTOLIN HFA) 108 (90 Base) MCG/ACT inhaler Inhale 2 puffs into the lungs every 6 hours as needed for Wheezing 1 Inhaler 3    fluticasone (ARNUITY ELLIPTA) 100 MCG/ACT AEPB Inhale 1 puff into the lungs daily 30 each 3    hydrocortisone (ANUSOL-HC) 2.5 % rectal cream Place rectally 2 times daily. 1 Tube 1    busPIRone (BUSPAR) 10 MG tablet Take 15 mg by mouth 2 times daily       insulin glargine (BASAGLAR KWIKPEN) 100 UNIT/ML injection pen Inject 18 Units into the skin nightly 5 pen 3     Current Facility-Administered Medications   Medication Dose Route Frequency Provider Last Rate Last Dose    levonorgestrel (MIRENA) IUD 52 mg 1 each  1 each Intrauterine Once Clent Likens, DO   1 each at 06/27/17 1057       Allergies:  shehas No Known Allergies. ROS:  CV:  Denies chest pain; palpitations; shortness of breath; swelling of feet, ankles; and loss of consciousness. CON: Denies fever and dizziness. ENT:  Denies hearing loss / ringing, ear infections hoarseness, and swallowing problems. RESP:  Denies chronic cough, spitting up blood, and asthma/wheezing. GI: Denies abdominal pain, change in bowel habits, nausea or vomiting, and blood in stools. :  Denies frequent urination, burning or painful urination, blood in the urine, and bladder incontinence.   NEURO:  Denies headache, memory loss, sleep disturbance, and tremor or movement disorder. PHYSICAL EXAM:    SKIN:  Intact without rashes, lesions or ulcerations. No obvious deformity or swelling. EYES:  Extraocular muscles intact. MOUTH: Oral mucosa moist.  No perioral lesions. PULM:  Respirations unlabored and regular. VASC:  Capillary refill less than 2 seconds. Hand/Wrist Exam  ROM:  Full flexor and extensor tendon function intact   Finger, hand, and wrist range of motion are WNL  Inspection-Deformity: no  Palpation-Tenderness: 1st dorsal compartment  There is is not thenar and is not interosseous atrophy. Strength- WNL  NEURO: Radial, ulnar, and median nerves are intact to motor and sensory testing. Two point discrimination is less than six mm. There is not decreased sensation to light touch and pinprick in the median and ulnar nerve distribution. CTS: Tinel's test at the wrist is negative. Flexion compression test negative  Phalen's test is negative. Finkelstein's test is positive. Elbow:  Range of motion and strength about the elbow is intact. Tinel's test is negative at the elbow. Cerv:  Full pain free range of motion with a negative Spurling's test.    RADIOLOGY: No results found. IMPRESSION:     1. De Quervain's tenosynovitis, right        PLAN:   We discussed some of the etiologies and natural histories of     ICD-10-CM    1. De Quervain's tenosynovitis, right M65.4      We discussed the various treatment alternatives including anti-inflammatory medications, physical therapy, injections, further imaging studies and as a last resort surgery. At this point I do think a cortisone injection is appropriate. After the r/b/a/p was discussed, verbal consent was obtained the patient was sterile prepped w betadine then cooled w cold spray the recleaned w an alcohol prep. I then injected 1ml 0.5% marcaine and 1ml 6mg betamethasone into the first dorsal compartment.  Pt tolerated procedure well and had immediate pain relief. F/u 8w    No follow-ups on file. Please be aware portions of this note were completed using voice recognition software and unforeseen errors may have occurred    Electronically signed by Edgardo Dennis DO, FAOASM  on 5/9/19 at 1:59 PM      No orders of the defined types were placed in this encounter.

## 2019-06-04 RX ORDER — LEVOTHYROXINE SODIUM 0.15 MG/1
TABLET ORAL
Qty: 45 TABLET | Refills: 0 | Status: SHIPPED | OUTPATIENT
Start: 2019-06-04 | End: 2019-07-24 | Stop reason: SDUPTHER

## 2019-06-04 RX ORDER — ATORVASTATIN CALCIUM 20 MG/1
TABLET, FILM COATED ORAL
Qty: 30 TABLET | Refills: 3 | Status: SHIPPED | OUTPATIENT
Start: 2019-06-04 | End: 2019-11-20 | Stop reason: SDUPTHER

## 2019-07-13 DIAGNOSIS — K59.00 CONSTIPATION, UNSPECIFIED CONSTIPATION TYPE: ICD-10-CM

## 2019-07-15 RX ORDER — LEVOTHYROXINE SODIUM 0.12 MG/1
TABLET ORAL
Qty: 90 TABLET | Refills: 0 | Status: SHIPPED | OUTPATIENT
Start: 2019-07-15 | End: 2019-07-24 | Stop reason: SDUPTHER

## 2019-07-16 RX ORDER — DOCUSATE SODIUM 100 MG/1
CAPSULE ORAL
Qty: 60 CAPSULE | Refills: 1 | Status: SHIPPED | OUTPATIENT
Start: 2019-07-16 | End: 2020-10-21 | Stop reason: SDUPTHER

## 2019-07-17 ENCOUNTER — OFFICE VISIT (OUTPATIENT)
Dept: OBGYN CLINIC | Age: 29
End: 2019-07-17
Payer: MEDICAID

## 2019-07-17 VITALS
BODY MASS INDEX: 29.88 KG/M2 | WEIGHT: 175 LBS | HEIGHT: 64 IN | DIASTOLIC BLOOD PRESSURE: 58 MMHG | SYSTOLIC BLOOD PRESSURE: 120 MMHG

## 2019-07-17 DIAGNOSIS — N90.89 CLITORAL IRRITATION: Primary | ICD-10-CM

## 2019-07-17 PROCEDURE — 99203 OFFICE O/P NEW LOW 30 MIN: CPT | Performed by: NURSE PRACTITIONER

## 2019-07-17 PROCEDURE — G8417 CALC BMI ABV UP PARAM F/U: HCPCS | Performed by: NURSE PRACTITIONER

## 2019-07-17 PROCEDURE — 1036F TOBACCO NON-USER: CPT | Performed by: NURSE PRACTITIONER

## 2019-07-17 PROCEDURE — G8427 DOCREV CUR MEDS BY ELIG CLIN: HCPCS | Performed by: NURSE PRACTITIONER

## 2019-07-17 ASSESSMENT — ENCOUNTER SYMPTOMS
ABDOMINAL PAIN: 0
COUGH: 0
SHORTNESS OF BREATH: 0
BACK PAIN: 0
DIARRHEA: 0
CONSTIPATION: 0
ABDOMINAL DISTENTION: 0

## 2019-07-17 NOTE — PROGRESS NOTES
Subjective:      Patient ID: Caty Forbes is being seen today for   Chief Complaint   Patient presents with   [facility] Establish Care     Extremely sensitive clitoris. Makes her sick to her stomach with any contact. Looking for answers. HPI  Here to discuss extreme pain and sensitivity on her clitoris. She has experienced this for as long as she can remember. It hasn't worsened or improved at all. Contact worsens it and continuous contact sometimes make her nauseated. The dao does not hurt at all, orgasming does not hurt. She has an IUD that she has had for 1 1/2 years. She is currently sexually active. Type 1 diabetic since 8 y/o  Review of Systems   Constitutional: Negative for appetite change and fatigue. HENT: Negative for congestion and hearing loss. Eyes: Negative for visual disturbance. Respiratory: Negative for cough and shortness of breath. Cardiovascular: Negative for chest pain and palpitations. Gastrointestinal: Negative for abdominal distention, abdominal pain, constipation and diarrhea. Genitourinary: Positive for vaginal pain (extreme sensitivity on clitoris). Negative for flank pain, frequency, menstrual problem, pelvic pain and vaginal discharge. Musculoskeletal: Negative for back pain. Neurological: Negative for syncope and headaches. Psychiatric/Behavioral: Negative for behavioral problems.        Vitals:    07/17/19 1353   BP: (!) 120/58   Position: Sitting   Cuff Size: Medium Adult   Weight: 175 lb (79.4 kg)   Height: 5' 3.75\" (1.619 m)     Current Outpatient Medications   Medication Sig Dispense Refill    EQUATE STOOL SOFTENER 100 MG capsule TAKE 1 CAPSULE BY MOUTH TWICE DAILY AS NEEDED FOR  CONSTIPATION 60 capsule 1    levothyroxine (SYNTHROID) 125 MCG tablet TAKE 1 TABLET BY MOUTH ONCE DAILY ON  ODD  DAYS 90 tablet 0    levothyroxine (SYNTHROID) 150 MCG tablet TAKE 1 TABLET BY MOUTH ON EVEN DAYS 45 tablet 0    atorvastatin (LIPITOR) 20 MG tablet TAKE 1 TABLET BY

## 2019-07-23 ENCOUNTER — TELEPHONE (OUTPATIENT)
Dept: OBGYN CLINIC | Age: 29
End: 2019-07-23

## 2019-07-24 ENCOUNTER — OFFICE VISIT (OUTPATIENT)
Dept: ENDOCRINOLOGY | Age: 29
End: 2019-07-24
Payer: MEDICAID

## 2019-07-24 ENCOUNTER — HOSPITAL ENCOUNTER (OUTPATIENT)
Age: 29
Setting detail: SPECIMEN
Discharge: HOME | End: 2019-07-24
Payer: MEDICAID

## 2019-07-24 VITALS
HEART RATE: 78 BPM | SYSTOLIC BLOOD PRESSURE: 114 MMHG | BODY MASS INDEX: 29.37 KG/M2 | WEIGHT: 172 LBS | DIASTOLIC BLOOD PRESSURE: 78 MMHG | OXYGEN SATURATION: 99 % | HEIGHT: 64 IN

## 2019-07-24 DIAGNOSIS — E10.9 TYPE 1 DIABETES MELLITUS WITHOUT COMPLICATION (HCC): Primary | ICD-10-CM

## 2019-07-24 DIAGNOSIS — E03.9 ACQUIRED HYPOTHYROIDISM: ICD-10-CM

## 2019-07-24 LAB
HBA1C MFR BLD: 6.7 %
THYROXINE, FREE: 1.2 NG/DL (ref 0.93–1.7)
TSH SERPL DL<=0.05 MIU/L-ACNC: 0.45 MIU/L (ref 0.3–5)

## 2019-07-24 PROCEDURE — G8417 CALC BMI ABV UP PARAM F/U: HCPCS | Performed by: INTERNAL MEDICINE

## 2019-07-24 PROCEDURE — 1036F TOBACCO NON-USER: CPT | Performed by: INTERNAL MEDICINE

## 2019-07-24 PROCEDURE — 3044F HG A1C LEVEL LT 7.0%: CPT | Performed by: INTERNAL MEDICINE

## 2019-07-24 PROCEDURE — 2022F DILAT RTA XM EVC RTNOPTHY: CPT | Performed by: INTERNAL MEDICINE

## 2019-07-24 PROCEDURE — G8427 DOCREV CUR MEDS BY ELIG CLIN: HCPCS | Performed by: INTERNAL MEDICINE

## 2019-07-24 PROCEDURE — 99214 OFFICE O/P EST MOD 30 MIN: CPT | Performed by: INTERNAL MEDICINE

## 2019-07-24 PROCEDURE — 83036 HEMOGLOBIN GLYCOSYLATED A1C: CPT | Performed by: INTERNAL MEDICINE

## 2019-07-24 RX ORDER — LEVOTHYROXINE SODIUM 0.12 MG/1
TABLET ORAL
Qty: 90 TABLET | Refills: 0 | Status: SHIPPED | OUTPATIENT
Start: 2019-07-24 | End: 2019-07-25

## 2019-07-24 RX ORDER — LEVOTHYROXINE SODIUM 0.15 MG/1
TABLET ORAL
Qty: 45 TABLET | Refills: 0 | Status: SHIPPED | OUTPATIENT
Start: 2019-07-24 | End: 2019-07-25 | Stop reason: DRUGHIGH

## 2019-07-24 RX ORDER — BLOOD SUGAR DIAGNOSTIC
STRIP MISCELLANEOUS
Qty: 100 EACH | Refills: 2 | Status: SHIPPED | OUTPATIENT
Start: 2019-07-24 | End: 2020-07-27 | Stop reason: SDUPTHER

## 2019-07-24 NOTE — PROGRESS NOTES
Dr. Lamont Tirado [facility]  [id number] [facility]. Abilio Malik is a 33 y.o. female who presents today follow up   Chief Complaint   Patient presents with    Diabetes     5 month recheck    . /78 (Site: Right Upper Arm, Position: Sitting, Cuff Size: Large Adult)   Pulse 78 Comment: REGULAR  Ht 5' 3.74\" (1.619 m)   Wt 172 lb (78 kg)   SpO2 99%   BMI 29.77 kg/m²     Lab Results   Component Value Date    LABA1C 6.7 07/24/2019     No results found for: EAG  Wt Readings from Last 3 Encounters:   07/24/19 172 lb (78 kg)   07/17/19 175 lb (79.4 kg)   05/09/19 175 lb (79.4 kg)       Body mass index is 29.77 kg/m². BP Readings from Last 3 Encounters:   07/24/19 114/78   07/17/19 (!) 120/58   05/09/19 115/66       Diabetes Management   Topic Date Due    Lipid screen  01/08/2019    Diabetic microalbuminuria test  05/10/2019       HPI:       HPI  AND REVIEW OF SYSTEMS    PATIENT COMES IN FOR FOLLOW UP OF     TYPE I DIABETES MELLITUS     HYPERCHOLESTEROLEMIA     OBESITY     HX OF DEPRESSION AND ANXIETY     ADVISED TO QUIT MARIJUANA     HAS IUD FOR CONTRACEPTION    CURRENT MEDS REVIEWED AND PREVIOUS  VISIT FROM 11/9/18  REVIEWED.     DIABETES FOR OVER 16 YEARS. MOVED TO [place] FROM [place] IN  2016 .       CURRENTLY SHE IS ON  TRESIBA  VIAL 18   UNITS IN A.M AND ADMALOG VIALBEFORE MEALS TID. 1 UNIT FOR 10 GM CARB AND CORRECTION 1 UNITS FOR 30 POINTS ABOVE 100. SHE   CHECKS HER SUGARS 3  TIMES A DAY. SUGARS ARE  RANGE. LOW SUGAR AROUND AFTER LUNCH IF EATS LESS.     PATIENT STATES SHE WAS ON INSULIN PUMP AT AGE 18 FOR  2 YEARS IN [place] BUT DID NOT LIKE TO STAY ON PUMP BECAUSE OF MECHANICAL PROBLEMS WITH PUMP.     DENIES HX OF DIAB RETINOPATHY     SHE HAS OCCASIONAL NUMBNESS IN TOES     NO CLAUDICATION IN LEGS     HAS HX OF HYPOTHYROIDISM  FOR MANY YEARS ON  LEVOTHYROXINE 150 MCG ALTERNATE WITH 125 MCG

## 2019-07-25 ENCOUNTER — TELEPHONE (OUTPATIENT)
Dept: ENDOCRINOLOGY | Age: 29
End: 2019-07-25

## 2019-07-25 DIAGNOSIS — E03.9 ACQUIRED HYPOTHYROIDISM: Primary | ICD-10-CM

## 2019-07-25 RX ORDER — LEVOTHYROXINE SODIUM 0.12 MG/1
TABLET ORAL
Qty: 90 TABLET | Refills: 0 | Status: SHIPPED | OUTPATIENT
Start: 2019-07-25 | End: 2020-07-27 | Stop reason: SDUPTHER

## 2019-08-07 ENCOUNTER — HOSPITAL ENCOUNTER (OUTPATIENT)
Age: 29
Setting detail: SPECIMEN
Discharge: HOME | End: 2019-08-07
Payer: MEDICAID

## 2019-08-07 ENCOUNTER — OFFICE VISIT (OUTPATIENT)
Dept: OBGYN CLINIC | Age: 29
End: 2019-08-07
Payer: MEDICAID

## 2019-08-07 ENCOUNTER — TELEPHONE (OUTPATIENT)
Dept: OBGYN CLINIC | Age: 29
End: 2019-08-07

## 2019-08-07 VITALS
HEIGHT: 64 IN | SYSTOLIC BLOOD PRESSURE: 126 MMHG | DIASTOLIC BLOOD PRESSURE: 82 MMHG | BODY MASS INDEX: 30.35 KG/M2 | WEIGHT: 177.8 LBS

## 2019-08-07 DIAGNOSIS — Z97.5 IUD (INTRAUTERINE DEVICE) IN PLACE: ICD-10-CM

## 2019-08-07 DIAGNOSIS — N90.89 CLITORAL IRRITATION: ICD-10-CM

## 2019-08-07 DIAGNOSIS — Z01.419 ENCOUNTER FOR GYNECOLOGICAL EXAMINATION: Primary | ICD-10-CM

## 2019-08-07 PROCEDURE — 99395 PREV VISIT EST AGE 18-39: CPT | Performed by: NURSE PRACTITIONER

## 2019-08-07 ASSESSMENT — ENCOUNTER SYMPTOMS
BACK PAIN: 0
ABDOMINAL PAIN: 0
SHORTNESS OF BREATH: 0
COUGH: 0
ABDOMINAL DISTENTION: 0
CONSTIPATION: 0
DIARRHEA: 0

## 2019-08-07 NOTE — Clinical Note
Pt was suppose to have referral to pelvic pain specialist (Delmi Bishop) for hypersensitive clitoris and she said she did not receive a call. Could we reach out to them again?

## 2019-08-07 NOTE — TELEPHONE ENCOUNTER
[facility]  [facility] and explained there was problem with fax number that was provided. Information has been re faxed. Pt called back to tell Tangela that they do take her insurance.

## 2019-08-07 NOTE — PROGRESS NOTES
Called [facility] at [phone] was instructed that their fax number is listed incorrectly. Given new fax number [phone]. Faxed confirmed.

## 2019-08-12 LAB — CYTOLOGY REPORT: NORMAL

## 2019-08-13 LAB
HPV SAMPLE: NORMAL
HPV, GENOTYPE 16: NOT DETECTED
HPV, GENOTYPE 18: NOT DETECTED
HPV, HIGH RISK OTHER: NOT DETECTED
HPV, INTERPRETATION: NORMAL
SPECIMEN DESCRIPTION: NORMAL

## 2019-11-20 ENCOUNTER — OFFICE VISIT (OUTPATIENT)
Dept: ENDOCRINOLOGY | Age: 29
End: 2019-11-20
Payer: MEDICAID

## 2019-11-20 ENCOUNTER — HOSPITAL ENCOUNTER (OUTPATIENT)
Age: 29
Setting detail: SPECIMEN
Discharge: HOME | End: 2019-11-20
Payer: MEDICAID

## 2019-11-20 VITALS
WEIGHT: 178.4 LBS | SYSTOLIC BLOOD PRESSURE: 100 MMHG | BODY MASS INDEX: 30.46 KG/M2 | DIASTOLIC BLOOD PRESSURE: 80 MMHG | HEART RATE: 104 BPM | HEIGHT: 64 IN

## 2019-11-20 DIAGNOSIS — E03.9 ACQUIRED HYPOTHYROIDISM: ICD-10-CM

## 2019-11-20 DIAGNOSIS — K59.00 CONSTIPATION, UNSPECIFIED CONSTIPATION TYPE: ICD-10-CM

## 2019-11-20 DIAGNOSIS — E10.9 TYPE 1 DIABETES MELLITUS WITHOUT COMPLICATION (HCC): ICD-10-CM

## 2019-11-20 DIAGNOSIS — E03.9 ACQUIRED HYPOTHYROIDISM: Primary | ICD-10-CM

## 2019-11-20 LAB
HBA1C MFR BLD: 6.5 %
THYROXINE, FREE: 1.48 NG/DL (ref 0.93–1.7)
TSH SERPL DL<=0.05 MIU/L-ACNC: 1.27 MIU/L (ref 0.3–5)

## 2019-11-20 PROCEDURE — G8427 DOCREV CUR MEDS BY ELIG CLIN: HCPCS | Performed by: INTERNAL MEDICINE

## 2019-11-20 PROCEDURE — G8417 CALC BMI ABV UP PARAM F/U: HCPCS | Performed by: INTERNAL MEDICINE

## 2019-11-20 PROCEDURE — 99214 OFFICE O/P EST MOD 30 MIN: CPT | Performed by: INTERNAL MEDICINE

## 2019-11-20 PROCEDURE — 2022F DILAT RTA XM EVC RTNOPTHY: CPT | Performed by: INTERNAL MEDICINE

## 2019-11-20 PROCEDURE — 3044F HG A1C LEVEL LT 7.0%: CPT | Performed by: INTERNAL MEDICINE

## 2019-11-20 PROCEDURE — 83036 HEMOGLOBIN GLYCOSYLATED A1C: CPT | Performed by: INTERNAL MEDICINE

## 2019-11-20 PROCEDURE — 1036F TOBACCO NON-USER: CPT | Performed by: INTERNAL MEDICINE

## 2019-11-20 PROCEDURE — G8484 FLU IMMUNIZE NO ADMIN: HCPCS | Performed by: INTERNAL MEDICINE

## 2019-11-20 RX ORDER — ATORVASTATIN CALCIUM 20 MG/1
TABLET, FILM COATED ORAL
Qty: 30 TABLET | Refills: 3 | Status: SHIPPED | OUTPATIENT
Start: 2019-11-20 | End: 2020-04-17

## 2020-01-14 RX ORDER — LEVOTHYROXINE SODIUM 0.15 MG/1
TABLET ORAL
Qty: 45 TABLET | Refills: 0 | Status: SHIPPED | OUTPATIENT
Start: 2020-01-14 | End: 2020-04-17

## 2020-03-19 NOTE — PROGRESS NOTES
Dr. Thiago Morrison [facility]  [id number] [facility]. [place] Sheryle Dee is a 30 y.o. female who presents today follow up   Chief Complaint   Patient presents with    Diabetes     4 month f/u appt   . /78 (Site: Left Upper Arm, Position: Sitting, Cuff Size: Medium Adult)   Pulse 91 Comment: REGULAR  Temp 97.3 °F (36.3 °C) (Tympanic)   Wt 181 lb (82.1 kg)   SpO2 98%   BMI 31.32 kg/m²     Lab Results   Component Value Date    LABA1C 7.6 03/25/2020     No results found for: EAG  Wt Readings from Last 3 Encounters:   03/25/20 181 lb (82.1 kg)   11/20/19 178 lb 6.4 oz (80.9 kg)   08/07/19 177 lb 12.8 oz (80.6 kg)       Body mass index is 31.32 kg/m². BP Readings from Last 3 Encounters:   03/25/20 122/78   11/20/19 100/80   08/07/19 126/82       Diabetes Management   Topic Date Due    Diabetic foot exam  12/30/2001    Diabetic retinal exam  12/30/2001    Lipid screen  01/08/2019    Diabetic microalbuminuria test  05/10/2019       HPI:     HPI AND REVIEW OF SYSTEMS    PATIENT COMES IN FOR FOLLOW UP OF     TYPE I DIABETES MELLITUS     HYPERCHOLESTEROLEMIA     OBESITY     HX OF DEPRESSION AND ANXIETY     MARIJUANA USE     HAS IUD FOR CONTRACEPTION    CURRENT MEDS REVIEWED AND PREVIOUS  VISIT FROM  11/20/19  REVIEWED.     HAS TYPE 1 DIABETES FOR OVER MANY  YEARS. MOVED TO [place] FROM [place] IN  2016 . CURRENTLY SHE IS ON  TRESIBA  VIAL 18   UNITS IN A.M AND ADMALOG VIALBEFORE MEALS TID. 1 UNIT FOR 10 GM CARB AND CORRECTION 1 UNITS FOR 30 POINTS ABOVE 100. SHE   CHECKS HER SUGARS  1 TO 2  TIMES A DAY. SUGARS ARE IN GOOD   RANGE DURING  DAY.  NO  SIGNIFICANT LOW SUGAR REACTIONS  AM SUGARS ARE HIGHER    DENIES HX OF DIAB RETINOPATHY     SHE HAS OCCASIONAL NUMBNESS IN TOES     NO CLAUDICATION IN LEGS     HAS HX OF HYPOTHYROIDISM  FOR MANY YEARS ON  LEVOTHYROXINE  125 MCG DAILY      NO HX OF HYPERTENSION Medication Dose Route Frequency Provider Last Rate Last Dose    levonorgestrel (MIRENA) IUD 52 mg 1 each  1 each Intrauterine Once Myrl Lucilleer, DO   1 each at 06/27/17 1057       No Known Allergies      Subjective:      Review of Systems     AS NOTED IN HPI      Objective:      Physical Exam     30YEAR OLD WHITE FEMALE OBESE  IN NO DISTRESS  VITALS REVIEWED. DISCUSSED  Eyes: ANGELI.   ENT: THROAT CLEAR. Brandon Krishan HEARING NORMAL  Neck: NO MASSES. NO ADENOPATHY. THYROID NOT ENLARGED. NO CAROTID BRUIT  Heart: REGULAR. NO MURMUR   Lungs: BREATHING COMFORTABLY. LUNGS CLEAR. NO WHEEZES  Abdomen: SOFT. NO TENDERNESS. NO MASSES LIVER AND SPLEEN NOT PALPABLE  Extremities: NO EDEMA. NO CALF TENDERNESS. NO VARICOSE VEINS. RT BIG TOE MEDIALLY DRY SKIN NOTED. . NO CALLUSES NOTED OVER FEET. Peripheral vascular : PEDAL PULSES GOOD   Rheumatological : NO JOINT SWELLING. HAS MILD TENDERNESS  BOTH WRISTS BASE OF THUMBS. Neurological/Memory: ALERT  AND ORIENTED  X 3 ,MONOFILAMENT SENSATIONS NORMAL. REFLEXES NORMAL.    Psychiatric:  MOOD AND AFFECT NORMAL  Skin: NO RASHES.        Assessment:   LAB / IMAGING    POCT  A1C 3/25/20, 7.6%    T4, Free   Order: [id number]   Collected:  11/20/2019 14:57      Ref Range & Units 4mo ago   Thyroxine, Free 0.93 - 1.70 ng/dL 1.48            POCT glycosylated hemoglobin (Hb A1C)   Order: [id number]   Collected:  11/20/2019 14:37      Ref Range & Units 4mo ago   Hemoglobin A1C % 6.5            TSH   Order: [id number]   Collected:  11/20/2019 14:57      Ref Range & Units 4mo ago   TSH 0.30 - 5.00 mIU/L 1.27                POCT  A1C 11/201/9, 6.5%    TSH   Order: [id number]   Collected:  7/24/2019 13:35      Ref Range & Units 3mo ago   TSH 0.30 - 5.00 mIU/L 0.45            T4, Free   Order: [id number]   Collected:  7/24/2019 13:35      Ref Range & Units 3mo ago   Thyroxine, Free 0.93 - 1.70 ng/dL 1.20                POCT  A1C 7/24/19,  6.7%    FREE T4  AND TSH NOT DONE    POCT  A1C  2/19/19 , 6.7%    11/19/2018 10:08 PM - Black, [facility] Incoming Lab Results From [email or web address]     Component Results     Component Value Ref Range & Units Status Collected Lab   TSH 0.03   0.30 - 5.00 mIU/L Final 11/19/2018  3:19 PM      POCT A1C 11/19/18,  7.2 %    NOT HAD TSH FROM AST ORDER. POCT A1C  7/9/18 , 6.9%    Urinalysis   Collected:  5/10/2018 14:20    Ref Range & Units 1mo ago   Color, UA YEL DARK YELLOW     Turbidity UA CLEAR CLEAR    Glucose, Ur NEG 3+     Bilirubin Urine NEG NEGATIVE    Ketones, Urine NEG TRACE     Specific Gravity, UA 1.005 - 1.030 1.024    Urine Hgb NEG NEGATIVE    pH, UA 5.0 - 8.0 7.0    Protein, UA NEG NEGATIVE    Urobilinogen, Urine NORM Normal    Nitrite, Urine NEG NEGATIVE    Leukocyte Esterase, Urine NEG NEGATIVE          Microalbumin, Ur   Order: [id number]   Collected:  5/10/2018 14:20    Ref Range & Units 1mo ago   Microalb, Ur <21 mg/L <12    Creatinine, Ur 28.0 - 217.0 mg/dL 69.6            Comprehensive Metabolic Panel   Order: [id number]   Collected:  5/10/2018 14:20    Ref Range & Units 1mo ago   Glucose 70 - 99 mg/dL 342     BUN 6 - 20 mg/dL 16    CREATININE 0.50 - 0.90 mg/dL 0.63    Bun/Cre Ratio 9 - 20 NOT REPORTED    Calcium 8.6 - 10.4 mg/dL 9.1    Sodium 135 - 144 mmol/L 133     Potassium 3.7 - 5.3 mmol/L 4.3    Chloride 98 - 107 mmol/L 98    CO2 20 - 31 mmol/L 22    Anion Gap 9 - 17 mmol/L 13    Alkaline Phosphatase 35 - 104 U/L 93    ALT 5 - 33 U/L 17    AST <32 U/L 20    Total Bilirubin 0.3 - 1.2 mg/dL 0.54    Total Protein 6.4 - 8.3 g/dL 7.1    Alb 3.5 - 5.2 g/dL 4.0    Albumin/Globulin Ratio 1.0 - 2.5 1.3    GFR Non-African American >60 mL/min >60    GFR African American >60 mL/min >60    GFR Comment                    PREVIOUS LAB:    POCT A1C ON 5/9/18  IS 7.7     1/8/18  Hemoglobin A1C 7.8       TSH 0.30 - 5.00 mIU/L 7.87        Microalb/Crt.  Ratio <25 mcg/mg creat      Cholesterol, Fasting <200 mg/dL 176       HDL >40 mg/dL 91       LDL Cholesterol 0 - 130 mg/dL 74       Triglyceride, Fasting <150 mg/dL 55            IMPRESSION :     TYPE I DIABETES MELLITUS  POCT  A1C 3/25/20, 7.6%    HYPOTHYROIDISM     HYPERCHOLESTEROLEMIA     OBESITY     HX OF DEPRESSION AND ANXIETY. SEES PSYCHIATRIST TAKING METHYLPHENIDATE     MARIJUANA USE     HAS IUD FOR CONTRACEPTION    HX OF BILATERAL TENOSYNAVITIS  WRISTS      1. Type 1 diabetes mellitus without complication ([facility] [address]    2. Acquired hypothyroidism    3. Constipation, unspecified constipation type          Plan:      1. A1C RESULT FROM TODAY  DISCUSSED. 2. LAB FROM 11/20/19  DISCUSSED  3. INCREASE TRESIBA  TO  24   UNITS DAILY  VIAL. PATIENT PREFERS [facility]. 4. CONTINUE INSULIN LISPRO  ADEMALOG  VIAL  TID BEFORE MEALS 1 UNIT PER 5 GM CARB FOR BREAKFAST AND LUNCH  AND 1 UNIT FOR 10 G CARB  FOR  DINNER. CORRECTION 1 UNIT FOR 30 POINTS ABOVE 100. DOES NOT WANT PEN  5. DIET AND EXERCISE  6. TO MONITOR SUGARS 3 TO 4 TIMED DAILY  7. FREESTYLE TRESSA CONTINUOUS GLUCOSE MONITORING - NOT COVERED. 8. KEEP ON  LEVOTHYROXINE  125 MCG DAILY  9. CONTINUE LIPITOR 20 MG DAILY  10. RETURN 4  MONTHS         Orders Placed This Encounter   Procedures    POCT glycosylated hemoglobin (Hb A1C)     Orders Placed This Encounter   Medications    Accu-Chek Multiclix Lancets MISC     Sig: 3 daily. give  Covered by insurance     Dispense:  100 each     Refill:  5        Return in about 4 months (around 7/25/2020) for DIABETES. Visit date not found        Patient given educational materials - see patient instructions. Discussed use, benefit, and side effects of prescribed medications. All patient questions answered. Pt voiced understanding. Reviewed health maintenance. Instructed to continue current medications, diet and exercise. Patient agreed with treatment plan. Follow up as directed.

## 2020-03-25 ENCOUNTER — OFFICE VISIT (OUTPATIENT)
Dept: ENDOCRINOLOGY | Age: 30
End: 2020-03-25
Payer: MEDICAID

## 2020-03-25 VITALS
SYSTOLIC BLOOD PRESSURE: 122 MMHG | DIASTOLIC BLOOD PRESSURE: 78 MMHG | BODY MASS INDEX: 31.32 KG/M2 | WEIGHT: 181 LBS | OXYGEN SATURATION: 98 % | HEART RATE: 91 BPM | TEMPERATURE: 97.3 F

## 2020-03-25 LAB — HBA1C MFR BLD: 7.6 %

## 2020-03-25 PROCEDURE — 1036F TOBACCO NON-USER: CPT | Performed by: INTERNAL MEDICINE

## 2020-03-25 PROCEDURE — G8417 CALC BMI ABV UP PARAM F/U: HCPCS | Performed by: INTERNAL MEDICINE

## 2020-03-25 PROCEDURE — 99214 OFFICE O/P EST MOD 30 MIN: CPT | Performed by: INTERNAL MEDICINE

## 2020-03-25 PROCEDURE — 2022F DILAT RTA XM EVC RTNOPTHY: CPT | Performed by: INTERNAL MEDICINE

## 2020-03-25 PROCEDURE — G8427 DOCREV CUR MEDS BY ELIG CLIN: HCPCS | Performed by: INTERNAL MEDICINE

## 2020-03-25 PROCEDURE — 3051F HG A1C>EQUAL 7.0%<8.0%: CPT | Performed by: INTERNAL MEDICINE

## 2020-03-25 PROCEDURE — 83036 HEMOGLOBIN GLYCOSYLATED A1C: CPT | Performed by: INTERNAL MEDICINE

## 2020-03-25 PROCEDURE — G8484 FLU IMMUNIZE NO ADMIN: HCPCS | Performed by: INTERNAL MEDICINE

## 2020-03-25 RX ORDER — LANCETS
EACH MISCELLANEOUS
Qty: 100 EACH | Refills: 5 | Status: SHIPPED | OUTPATIENT
Start: 2020-03-25

## 2020-03-25 RX ORDER — INSULIN DEGLUDEC INJECTION 100 U/ML
INJECTION, SOLUTION SUBCUTANEOUS
Qty: 2 VIAL | Refills: 3 | Status: SHIPPED
Start: 2020-03-25 | End: 2020-03-25 | Stop reason: DRUGHIGH

## 2020-03-25 RX ORDER — INSULIN DEGLUDEC INJECTION 100 U/ML
INJECTION, SOLUTION SUBCUTANEOUS
Qty: 2 VIAL | Refills: 3 | Status: SHIPPED
Start: 2020-03-25 | End: 2020-07-27 | Stop reason: SDUPTHER

## 2020-04-17 RX ORDER — ATORVASTATIN CALCIUM 20 MG/1
TABLET, FILM COATED ORAL
Qty: 30 TABLET | Refills: 3 | Status: SHIPPED | OUTPATIENT
Start: 2020-04-17 | End: 2020-07-27 | Stop reason: SDUPTHER

## 2020-04-17 RX ORDER — LEVOTHYROXINE SODIUM 0.15 MG/1
TABLET ORAL
Qty: 45 TABLET | Refills: 3 | Status: SHIPPED | OUTPATIENT
Start: 2020-04-17 | End: 2020-07-27

## 2020-04-22 ENCOUNTER — TELEPHONE (OUTPATIENT)
Dept: FAMILY MEDICINE CLINIC | Age: 30
End: 2020-04-22

## 2020-07-22 NOTE — PROGRESS NOTES
Dr. [facility]  [address] Antoine Rayn is a 30 y.o. female who presents today follow up   Chief Complaint   Patient presents with    Diabetes     recheck   . /72   Pulse 110 Comment: REGULAR  Temp 98.2 °F (36.8 °C)   Wt 177 lb (80.3 kg)   SpO2 98%   BMI 30.63 kg/m²     Lab Results   Component Value Date    LABA1C 6.3 07/27/2020     No results found for: EAG  Wt Readings from Last 3 Encounters:   07/27/20 177 lb (80.3 kg)   03/25/20 181 lb (82.1 kg)   11/20/19 178 lb 6.4 oz (80.9 kg)       Body mass index is 30.63 kg/m². BP Readings from Last 3 Encounters:   07/27/20 116/72   03/25/20 122/78   11/20/19 100/80       Diabetes Management   Topic Date Due    Diabetic foot exam  12/30/2001    Diabetic retinal exam  12/30/2001    Lipid screen  01/08/2019    Diabetic microalbuminuria test  05/10/2019       HPI:     HPI AND REVIEW OF SYSTEMS    PATIENT COMES IN FOR FOLLOW UP OF     TYPE I DIABETES MELLITUS     HYPERCHOLESTEROLEMIA     OBESITY     HX OF DEPRESSION AND ANXIETY     MARIJUANA USE     HAS IUD FOR CONTRACEPTION    CURRENT MEDS REVIEWED AND PREVIOUS  VISIT FROM  3/25/20  REVIEWED.     HAS TYPE 1 DIABETES FOR OVER MANY  YEARS. MOVED TO [place] FROM [place] IN  2016 . CURRENTLY SHE IS ON  TRESIBA  VIAL  20   UNITS IN A.M AND ADMALOG VIALBEFORE MEALS TID. 1 UNIT FOR 10 GM CARB AND CORRECTION 1 UNITS FOR 30 POINTS ABOVE 100. SHE   CHECKS HER SUGARS  1 TO 2  TIMES A DAY. SUGARS  FLUCTUATING   55  RANGE. DOES CARB COUNTING. DOES FEEL SYMPTOMS OF LOW  SUGAR. NO SIGNIFICANT LOW  SUGAR  REACTIONS.     DENIES HX OF DIAB RETINOPATHY     NO CLAUDICATION IN LEGS     HAS HX OF HYPOTHYROIDISM  FOR MANY YEARS ON  LEVOTHYROXINE  125 MCG DAILY      NO HX OF HYPERTENSION  OR KIDNEY PROBLEMS     HAS HYPERCHOLESTEROLEMIA TAKES LIPITOR 20 MG DAILY     HAS HX OF ASTHMA STABLE USES PRN ALBUTEROL INHALER.     HX OF DEPRESSION AND ANXIETY ON EFFEXOR 150 MG DAILY AND BUSPAR 10 MG DAILY. SHE STATES SMOKING MARIJUANA DAILY  WHICH HELPS HER ANXIETY.     SHE HAS  IUD FOR CONTRACEPTION     DENIES POLYURIA OR POLY DYPSIA  NO VISION CHANGES. NO RETINOPATHY  OCCASIONAL PARESTHESIAS IN TOES  NO CLAUDICATION  NO DIZZINESS.     NO CHEST PAIN   NO SHORTNESS OF BREATH . HX HX OF ASTHMA USES PRN INHALER. NO COUGH. NO HX OF SLEEP APNEA. PAST HX OF SMOKING 1PPD FOR 3 YEARS. QUIT 2016. TAKES CLARITIN FOR ALLERGIES. BOWELS  OCCASIONAL CONSTIPATION. NO ACID REFLUX. MENSES IRREGULAR. HAS IUD. HAS HX OF ANXIETY AND  DEPRESSION ON MEDS FROM [facility] AT [facility]. Past Medical History:   Diagnosis Date    Anxiety     Asthma     Depression     Diabetes ([address]     Eczema     Herniated cervical disc     Hyperlipidemia     Hypothyroidism         Past Surgical History:   Procedure Laterality Date    BONE MARROW TRANSPLANT  1994    DONOR;  for Brother    CYST REMOVAL      LOWER LIP    ME COLONOSCOPY W/BIOPSY SINGLE/MULTIPLE N/A 10/6/2017    COLONOSCOPY performed by Eduardo Bowser DO at [address]       Family History   Problem Relation Age of Onset    Other Mother     Other Father     Cervical Cancer Maternal Grandmother     Cancer Maternal Grandmother         COLON    Diabetes Paternal Grandfather      Social History     Tobacco Use    Smoking status: Former Smoker     Packs/day: 0.50     Years: 3.00     Pack years: 1.50     Types: Cigarettes     Last attempt to quit: 9/1/2016     Years since quitting: 3.9    Smokeless tobacco: Never Used   Substance Use Topics    Alcohol use:  Yes     Alcohol/week: 1.0 standard drinks     Types: 1 Standard drinks or equivalent per week     Comment: ooccasionally        Current Outpatient Medications   Medication Sig Dispense Refill    atorvastatin (LIPITOR) 20 MG tablet One a day 90 tablet 0    blood glucose test strips (ONETOUCH VERIO) strip USE AS DIRECTED 4 TIMES DAILY 200 strip 0    Insulin Degludec (TRESIBA) 100 UNIT/ML SOLN 18  UNITS DAILY ONCE DAILY 2 vial 0    Insulin Syringe-Needle U-100 ([employer] INSULIN SYR 0.3CC/30G) 30G X 5/16\" 0.3 ML MISC Use as directed 100 each 0    loratadine (CLARITIN) 10 MG tablet Take 1 tablet by mouth daily 30 tablet 0    levothyroxine (SYNTHROID) 125 MCG tablet TAKE 1 TABLET BY MOUTH ONCE DAILY 90 tablet 0    insulin lispro (ADMELOG) 100 UNIT/ML injection vial INJECT 25 UNITS SUBCUTANEOUSLY THREE TIMES DAILY BEFORE MEALS 90 mL 0    Accu-Chek Multiclix Lancets MISC 3 daily.  give  Covered by insurance 100 each 5    Alpha-Lipoic Acid 100 MG CAPS       venlafaxine (EFFEXOR XR) 75 MG extended release capsule Take 125 mg by mouth daily       lamoTRIgine (LAMICTAL) 100 MG tablet Take 100 mg by mouth daily      albuterol sulfate HFA (VENTOLIN HFA) 108 (90 Base) MCG/ACT inhaler Inhale 2 puffs into the lungs every 6 hours as needed for Wheezing 1 Inhaler 3    busPIRone (BUSPAR) 10 MG tablet Take 15 mg by mouth 2 times daily       methylphenidate (METADATE CD) 20 MG extended release capsule       EQUATE STOOL SOFTENER 100 MG capsule TAKE 1 CAPSULE BY MOUTH TWICE DAILY AS NEEDED FOR  CONSTIPATION (Patient not taking: Reported on 3/25/2020) 60 capsule 1    ibuprofen (ADVIL;MOTRIN) 800 MG tablet Take 1 tablet by mouth 3 times daily (with meals) (Patient not taking: Reported on 7/27/2020) 60 tablet 0     MG CAPS   1    B Complex Vitamins (B COMPLEX 100 PO)       Probiotic Product (PROBIOTIC-10 PO)       Cholecalciferol (VITAMIN D3) 41555 units TABS       BASAGLAR KWIKPEN 100 UNIT/ML injection pen INJECT 22 UNITS SUBCUTANEOUSLY NIGHTLY (Patient not taking: Reported on 3/25/2020) 3 mL 9     Current Facility-Administered Medications   Medication Dose Route Frequency Provider Last Rate Last Dose    levonorgestrel (MIRENA) IUD 52 mg 1 each  1 each Intrauterine Once Prasanth Olea Lab Results From [place]     Component Results     Component Value Ref Range & Units Status Collected Lab   TSH 0.03   0.30 - 5.00 mIU/L Final 11/19/2018  3:19 PM      POCT A1C 11/19/18,  7.2 %    NOT HAD TSH FROM AST ORDER. POCT A1C  7/9/18 , 6.9%    Urinalysis   Collected:  5/10/2018 14:20    Ref Range & Units 1mo ago   Color, UA YEL DARK YELLOW     Turbidity UA CLEAR CLEAR    Glucose, Ur NEG 3+     Bilirubin Urine NEG NEGATIVE    Ketones, Urine NEG TRACE     Specific Gravity, UA 1.005 - 1.030 1.024    Urine Hgb NEG NEGATIVE    pH, UA 5.0 - 8.0 7.0    Protein, UA NEG NEGATIVE    Urobilinogen, Urine NORM Normal    Nitrite, Urine NEG NEGATIVE    Leukocyte Esterase, Urine NEG NEGATIVE          Microalbumin, Ur   Order: [id number]   Collected:  5/10/2018 14:20    Ref Range & Units 1mo ago   Microalb, Ur <21 mg/L <12    Creatinine, Ur 28.0 - 217.0 mg/dL 69.6            Comprehensive Metabolic Panel   Order: [id number]   Collected:  5/10/2018 14:20    Ref Range & Units 1mo ago   Glucose 70 - 99 mg/dL 342     BUN 6 - 20 mg/dL 16    CREATININE 0.50 - 0.90 mg/dL 0.63    Bun/Cre Ratio 9 - 20 NOT REPORTED    Calcium 8.6 - 10.4 mg/dL 9.1    Sodium 135 - 144 mmol/L 133     Potassium 3.7 - 5.3 mmol/L 4.3    Chloride 98 - 107 mmol/L 98    CO2 20 - 31 mmol/L 22    Anion Gap 9 - 17 mmol/L 13    Alkaline Phosphatase 35 - 104 U/L 93    ALT 5 - 33 U/L 17    AST <32 U/L 20    Total Bilirubin 0.3 - 1.2 mg/dL 0.54    Total Protein 6.4 - 8.3 g/dL 7.1    Alb 3.5 - 5.2 g/dL 4.0    Albumin/Globulin Ratio 1.0 - 2.5 1.3    GFR Non-African American >60 mL/min >60    GFR African American >60 mL/min >60    GFR Comment                    PREVIOUS LAB:    POCT A1C ON 5/9/18  IS 7.7     1/8/18  Hemoglobin A1C 7.8       TSH 0.30 - 5.00 mIU/L 7.87        Microalb/Crt.  Ratio <25 mcg/mg creat      Cholesterol, Fasting <200 mg/dL 176       HDL >40 mg/dL 91       LDL Cholesterol 0 - 130 mg/dL 74          Triglyceride, Fasting <150 mg/dL 55         IMPRESSION :     TYPE I DIABETES MELLITUS  POCT  A1C 20,  6.3%    HYPOTHYROIDISM     HYPERCHOLESTEROLEMIA     OBESITY     HX OF DEPRESSION AND ANXIETY. SEES PSYCHIATRIST TAKING METHYLPHENIDATE     MARIJUANA USE     HAS IUD FOR CONTRACEPTION    HX OF BILATERAL TENOSYNAVITIS  WRISTS  STABLE. 1. Type 1 diabetes mellitus without complication ([address]    2. Acquired hypothyroidism          Plan:      1. A1C RESULT FROM TODAY  DISCUSSED. 2. DECREASE TRESIBA  TO 18   UNITS DAILY  VIAL. PATIENT PREFERS [facility] . 3. CONTINUE INSULIN LISPRO  ADEMALOG  VIAL  TID BEFORE MEALS 1 UNIT PER 5 GM CARB FOR BREAKFAST AND LUNCH  AND 1 UNIT FOR 10 G CARB  FOR  DINNER. CORRECTION 1 UNIT FOR 30 POINTS ABOVE 100. DOES NOT WANT PEN  4. DIET AND EXERCISE  5. TO MONITOR SUGARS 3 TO 4 TIMED DAILY  6. FREESTYLE TRESSA CONTINUOUS GLUCOSE MONITORING - NOT COVERED. 7. KEEP ON  LEVOTHYROXINE  125 MCG DAILY  8. CONTINUE LIPITOR 20 MG DAILY  9. PATIENT TO FOLLOW  WITH PCP SINCE I AM RETIRING 20   10. ALSO  REFER TO ENDOCRINOLOGIST  SINCE I AM RETIRING . 11. PATIENT WANTS TO SEE PCP  IN THIS OFFICE.       Orders Placed This Encounter   Procedures   Sable Sacks, MD, Endocrinology, [place]     Referral Priority:   Routine     Referral Type:   Eval and Treat     Referral Reason:   Specialty Services Required     Referred to Provider:   Felecia Angel     Requested Specialty:   Endocrinology     Number of Visits Requested:   1    POCT glycosylated hemoglobin (Hb A1C)     Orders Placed This Encounter   Medications    atorvastatin (LIPITOR) 20 MG tablet     Sig: One a day     Dispense:  90 tablet     Refill:  0    blood glucose test strips (ONETOUCH VERIO) strip     Sig: USE AS DIRECTED 4 TIMES DAILY     Dispense:  200 strip     Refill:  0     Please consider 90 day supplies to promote better adherence    Insulin Degludec (TRESIBA) 100 UNIT/ML SOLN     Si  UNITS DAILY ONCE DAILY     Dispense:  2 vial     Refill:  0    Insulin Syringe-Needle U-100 ([employer] INSULIN SYR 0.3CC/30G) 30G X 5/16\" 0.3 ML MISC     Sig: Use as directed     Dispense:  100 each     Refill:  0    loratadine (CLARITIN) 10 MG tablet     Sig: Take 1 tablet by mouth daily     Dispense:  30 tablet     Refill:  0    levothyroxine (SYNTHROID) 125 MCG tablet     Sig: TAKE 1 TABLET BY MOUTH ONCE DAILY     Dispense:  90 tablet     Refill:  0        No follow-ups on file. Visit date not found        Patient given educational materials - see patient instructions. Discussed use, benefit, and side effects of prescribed medications. All patient questions answered. Pt voiced understanding. Reviewed health maintenance. Instructed to continue current medications, diet and exercise. Patient agreed with treatment plan. Follow up as directed.

## 2020-07-27 ENCOUNTER — OFFICE VISIT (OUTPATIENT)
Dept: ENDOCRINOLOGY | Age: 30
End: 2020-07-27
Payer: MEDICAID

## 2020-07-27 VITALS
TEMPERATURE: 98.2 F | HEART RATE: 110 BPM | WEIGHT: 177 LBS | OXYGEN SATURATION: 98 % | DIASTOLIC BLOOD PRESSURE: 72 MMHG | SYSTOLIC BLOOD PRESSURE: 116 MMHG | BODY MASS INDEX: 30.63 KG/M2

## 2020-07-27 LAB — HBA1C MFR BLD: 6.3 %

## 2020-07-27 PROCEDURE — G8417 CALC BMI ABV UP PARAM F/U: HCPCS | Performed by: INTERNAL MEDICINE

## 2020-07-27 PROCEDURE — 2022F DILAT RTA XM EVC RTNOPTHY: CPT | Performed by: INTERNAL MEDICINE

## 2020-07-27 PROCEDURE — 83036 HEMOGLOBIN GLYCOSYLATED A1C: CPT | Performed by: INTERNAL MEDICINE

## 2020-07-27 PROCEDURE — 99214 OFFICE O/P EST MOD 30 MIN: CPT | Performed by: INTERNAL MEDICINE

## 2020-07-27 PROCEDURE — 3044F HG A1C LEVEL LT 7.0%: CPT | Performed by: INTERNAL MEDICINE

## 2020-07-27 PROCEDURE — 1036F TOBACCO NON-USER: CPT | Performed by: INTERNAL MEDICINE

## 2020-07-27 PROCEDURE — G8427 DOCREV CUR MEDS BY ELIG CLIN: HCPCS | Performed by: INTERNAL MEDICINE

## 2020-07-27 RX ORDER — LEVOTHYROXINE SODIUM 0.15 MG/1
TABLET ORAL
Qty: 45 TABLET | Refills: 3 | Status: CANCELLED | OUTPATIENT
Start: 2020-07-27

## 2020-07-27 RX ORDER — INSULIN DEGLUDEC INJECTION 100 U/ML
INJECTION, SOLUTION SUBCUTANEOUS
Qty: 2 VIAL | Refills: 0 | Status: SHIPPED | OUTPATIENT
Start: 2020-07-27 | End: 2020-10-21 | Stop reason: SDUPTHER

## 2020-07-27 RX ORDER — BLOOD SUGAR DIAGNOSTIC
STRIP MISCELLANEOUS
Qty: 200 STRIP | Refills: 0 | Status: SHIPPED | OUTPATIENT
Start: 2020-07-27 | End: 2020-10-21 | Stop reason: ALTCHOICE

## 2020-07-27 RX ORDER — BLOOD SUGAR DIAGNOSTIC
STRIP MISCELLANEOUS
Qty: 100 EACH | Refills: 0 | Status: SHIPPED | OUTPATIENT
Start: 2020-07-27 | End: 2021-03-10 | Stop reason: SDUPTHER

## 2020-07-27 RX ORDER — LORATADINE 10 MG/1
10 TABLET ORAL DAILY
Qty: 30 TABLET | Refills: 0 | Status: SHIPPED | OUTPATIENT
Start: 2020-07-27

## 2020-07-27 RX ORDER — METHYLPHENIDATE HYDROCHLORIDE 20 MG/1
CAPSULE, EXTENDED RELEASE ORAL
COMMUNITY
Start: 2020-07-13

## 2020-07-27 RX ORDER — ATORVASTATIN CALCIUM 20 MG/1
TABLET, FILM COATED ORAL
Qty: 90 TABLET | Refills: 0 | Status: SHIPPED | OUTPATIENT
Start: 2020-07-27 | End: 2021-03-10 | Stop reason: SDUPTHER

## 2020-07-27 RX ORDER — LEVOTHYROXINE SODIUM 0.12 MG/1
TABLET ORAL
Qty: 90 TABLET | Refills: 0 | Status: SHIPPED | OUTPATIENT
Start: 2020-07-27 | End: 2020-10-21 | Stop reason: SDUPTHER

## 2020-10-21 ENCOUNTER — HOSPITAL ENCOUNTER (OUTPATIENT)
Age: 30
Setting detail: SPECIMEN
Discharge: HOME | End: 2020-10-21
Payer: MEDICAID

## 2020-10-21 ENCOUNTER — OFFICE VISIT (OUTPATIENT)
Dept: FAMILY MEDICINE CLINIC | Age: 30
End: 2020-10-21
Payer: MEDICAID

## 2020-10-21 VITALS
TEMPERATURE: 97.3 F | WEIGHT: 181 LBS | OXYGEN SATURATION: 100 % | HEART RATE: 66 BPM | HEIGHT: 64 IN | BODY MASS INDEX: 30.9 KG/M2 | SYSTOLIC BLOOD PRESSURE: 122 MMHG | DIASTOLIC BLOOD PRESSURE: 80 MMHG

## 2020-10-21 PROBLEM — J45.30 MILD PERSISTENT ASTHMA WITHOUT COMPLICATION: Status: ACTIVE | Noted: 2020-10-21

## 2020-10-21 PROBLEM — N93.9 ABNORMAL UTERINE BLEEDING: Status: RESOLVED | Noted: 2017-05-16 | Resolved: 2020-10-21

## 2020-10-21 PROBLEM — B37.2 YEAST INFECTION OF THE SKIN: Status: RESOLVED | Noted: 2017-06-27 | Resolved: 2020-10-21

## 2020-10-21 LAB
ABSOLUTE EOS #: 0.4 K/UL (ref 0–0.4)
ABSOLUTE IMMATURE GRANULOCYTE: ABNORMAL K/UL (ref 0–0.3)
ABSOLUTE LYMPH #: 1.8 K/UL (ref 1–4.8)
ABSOLUTE MONO #: 0.4 K/UL (ref 0.1–1.3)
ALBUMIN SERPL-MCNC: 4.6 G/DL (ref 3.5–5.2)
ALBUMIN/GLOBULIN RATIO: ABNORMAL (ref 1–2.5)
ALP BLD-CCNC: 88 U/L (ref 35–104)
ALT SERPL-CCNC: 18 U/L (ref 5–33)
ANION GAP SERPL CALCULATED.3IONS-SCNC: 8 MMOL/L (ref 9–17)
AST SERPL-CCNC: 21 U/L
BASOPHILS # BLD: 1 % (ref 0–2)
BASOPHILS ABSOLUTE: 0 K/UL (ref 0–0.2)
BILIRUB SERPL-MCNC: 0.39 MG/DL (ref 0.3–1.2)
BUN BLDV-MCNC: 10 MG/DL (ref 6–20)
BUN/CREAT BLD: ABNORMAL (ref 9–20)
CALCIUM SERPL-MCNC: 9.4 MG/DL (ref 8.6–10.4)
CHLORIDE BLD-SCNC: 103 MMOL/L (ref 98–107)
CHOLESTEROL/HDL RATIO: 2.1
CHOLESTEROL: 166 MG/DL
CO2: 30 MMOL/L (ref 20–31)
CREAT SERPL-MCNC: 0.72 MG/DL (ref 0.5–0.9)
CREATININE URINE: 76.6 MG/DL (ref 28–217)
DIFFERENTIAL TYPE: ABNORMAL
EOSINOPHILS RELATIVE PERCENT: 7 % (ref 0–4)
GFR AFRICAN AMERICAN: >60 ML/MIN
GFR NON-AFRICAN AMERICAN: >60 ML/MIN
GFR SERPL CREATININE-BSD FRML MDRD: ABNORMAL ML/MIN/{1.73_M2}
GFR SERPL CREATININE-BSD FRML MDRD: ABNORMAL ML/MIN/{1.73_M2}
GLUCOSE BLD-MCNC: 66 MG/DL (ref 70–99)
HBV SURFACE AB TITR SER: 471.2 MIU/ML
HCT VFR BLD CALC: 46.2 % (ref 36–46)
HDLC SERPL-MCNC: 80 MG/DL
HEMOGLOBIN: 15.8 G/DL (ref 12–16)
IMMATURE GRANULOCYTES: ABNORMAL %
LDL CHOLESTEROL: 80 MG/DL (ref 0–130)
LYMPHOCYTES # BLD: 28 % (ref 24–44)
MCH RBC QN AUTO: 31.7 PG (ref 26–34)
MCHC RBC AUTO-ENTMCNC: 34.2 G/DL (ref 31–37)
MCV RBC AUTO: 92.7 FL (ref 80–100)
MICROALBUMIN/CREAT 24H UR: <12 MG/L
MICROALBUMIN/CREAT UR-RTO: NORMAL MCG/MG CREAT
MONOCYTES # BLD: 7 % (ref 1–7)
NRBC AUTOMATED: ABNORMAL PER 100 WBC
PDW BLD-RTO: 12.1 % (ref 11.5–14.9)
PLATELET # BLD: 284 K/UL (ref 150–450)
PLATELET ESTIMATE: ABNORMAL
PMV BLD AUTO: 7.7 FL (ref 6–12)
POTASSIUM SERPL-SCNC: 4.4 MMOL/L (ref 3.7–5.3)
RBC # BLD: 4.98 M/UL (ref 4–5.2)
RBC # BLD: ABNORMAL 10*6/UL
SEG NEUTROPHILS: 57 % (ref 36–66)
SEGMENTED NEUTROPHILS ABSOLUTE COUNT: 3.7 K/UL (ref 1.3–9.1)
SODIUM BLD-SCNC: 141 MMOL/L (ref 135–144)
TOTAL PROTEIN: 7.2 G/DL (ref 6.4–8.3)
TRIGL SERPL-MCNC: 29 MG/DL
VLDLC SERPL CALC-MCNC: NORMAL MG/DL (ref 1–30)
WBC # BLD: 6.3 K/UL (ref 3.5–11)
WBC # BLD: ABNORMAL 10*3/UL

## 2020-10-21 PROCEDURE — 80053 COMPREHEN METABOLIC PANEL: CPT

## 2020-10-21 PROCEDURE — G8482 FLU IMMUNIZE ORDER/ADMIN: HCPCS | Performed by: FAMILY MEDICINE

## 2020-10-21 PROCEDURE — 85025 COMPLETE CBC W/AUTO DIFF WBC: CPT

## 2020-10-21 PROCEDURE — 99385 PREV VISIT NEW AGE 18-39: CPT | Performed by: FAMILY MEDICINE

## 2020-10-21 PROCEDURE — 36415 COLL VENOUS BLD VENIPUNCTURE: CPT

## 2020-10-21 PROCEDURE — 86317 IMMUNOASSAY INFECTIOUS AGENT: CPT

## 2020-10-21 PROCEDURE — 86787 VARICELLA-ZOSTER ANTIBODY: CPT

## 2020-10-21 PROCEDURE — 80061 LIPID PANEL: CPT

## 2020-10-21 PROCEDURE — 82043 UR ALBUMIN QUANTITATIVE: CPT

## 2020-10-21 PROCEDURE — 82570 ASSAY OF URINE CREATININE: CPT

## 2020-10-21 RX ORDER — INSULIN DEGLUDEC INJECTION 100 U/ML
INJECTION, SOLUTION SUBCUTANEOUS
Qty: 2 VIAL | Refills: 0 | Status: SHIPPED | OUTPATIENT
Start: 2020-10-21 | End: 2022-02-17

## 2020-10-21 RX ORDER — DOCUSATE SODIUM 100 MG/1
CAPSULE, LIQUID FILLED ORAL
Qty: 60 CAPSULE | Refills: 1 | Status: SHIPPED | OUTPATIENT
Start: 2020-10-21 | End: 2021-03-10 | Stop reason: SDUPTHER

## 2020-10-21 RX ORDER — ALBUTEROL SULFATE 90 UG/1
2 AEROSOL, METERED RESPIRATORY (INHALATION) EVERY 6 HOURS PRN
Qty: 1 INHALER | Refills: 3 | Status: SHIPPED | OUTPATIENT
Start: 2020-10-21 | End: 2022-02-21

## 2020-10-21 RX ORDER — BUSPIRONE HYDROCHLORIDE 15 MG/1
TABLET ORAL
COMMUNITY
Start: 2020-10-07 | End: 2022-02-17

## 2020-10-21 RX ORDER — LEVOTHYROXINE SODIUM 0.12 MG/1
TABLET ORAL
Qty: 90 TABLET | Refills: 0 | Status: SHIPPED | OUTPATIENT
Start: 2020-10-21 | End: 2021-05-19

## 2020-10-21 ASSESSMENT — PATIENT HEALTH QUESTIONNAIRE - PHQ9
2. FEELING DOWN, DEPRESSED OR HOPELESS: 0
SUM OF ALL RESPONSES TO PHQ9 QUESTIONS 1 & 2: 0
SUM OF ALL RESPONSES TO PHQ QUESTIONS 1-9: 0
1. LITTLE INTEREST OR PLEASURE IN DOING THINGS: 0
SUM OF ALL RESPONSES TO PHQ QUESTIONS 1-9: 0
SUM OF ALL RESPONSES TO PHQ QUESTIONS 1-9: 0

## 2020-10-21 ASSESSMENT — ENCOUNTER SYMPTOMS
ABDOMINAL PAIN: 0
CONSTIPATION: 0
ABDOMINAL DISTENTION: 0
PHOTOPHOBIA: 0
BACK PAIN: 0
VOMITING: 0
WHEEZING: 0
SHORTNESS OF BREATH: 0
RHINORRHEA: 0
COUGH: 0
SINUS PRESSURE: 0
COLOR CHANGE: 0
DIARRHEA: 0
BLOOD IN STOOL: 0

## 2020-10-21 ASSESSMENT — VISUAL ACUITY
OS_CC: 20/13
OD_CC: 20/15

## 2020-10-21 NOTE — PROGRESS NOTES
Visit Information    Have you changed or started any medications since your last visit including any over-the-counter medicines, vitamins, or herbal medicines? no   Have you stopped taking any of your medications? Is so, why? -  no  Are you having any side effects from any of your medications? - no    Have you seen any other physician or provider since your last visit?  no   Have you had any other diagnostic tests since your last visit?  no   Have you been seen in the emergency room and/or had an admission in a hospital since we last saw you?  no   Have you had your routine dental cleaning in the past 6 months?  yes -      Do you have an active MyChart account? If no, what is the barrier?   Yes    Patient Care Team:  Charleen Calix MD as PCP - General (Family Medicine)    Medical History Review  Past Medical, Family, and Social History reviewed and does contribute to the patient presenting condition    Health Maintenance   Topic Date Due    Varicella vaccine (1 of 2 - 2-dose childhood series) 12/30/1992    Diabetic foot exam  12/30/2001    Diabetic retinal exam  12/30/2001    Hepatitis B vaccine (1 of 3 - Risk 3-dose series) 12/30/2010    Lipid screen  01/08/2019    Diabetic microalbuminuria test  05/10/2019    Flu vaccine (1) 09/01/2020    TSH testing  11/20/2020    A1C test (Diabetic or Prediabetic)  07/27/2021    Cervical cancer screen  08/07/2022    DTaP/Tdap/Td vaccine (2 - Td) 04/07/2027    Pneumococcal 0-64 years Vaccine  Completed    HIV screen  Completed    Hepatitis A vaccine  Aged Out    Hib vaccine  Aged Out    Meningococcal (ACWY) vaccine  Aged Out
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
## 2020-10-22 PROCEDURE — 90686 IIV4 VACC NO PRSV 0.5 ML IM: CPT | Performed by: FAMILY MEDICINE

## 2020-10-22 PROCEDURE — 90471 IMMUNIZATION ADMIN: CPT | Performed by: FAMILY MEDICINE

## 2020-10-23 LAB — VZV IGG SER QL IA: 1.38

## 2020-11-04 NOTE — TELEPHONE ENCOUNTER
Please Approve or Refuse.   Send to Pharmacy per Pt's Request:      Next Visit Date:  12/7/2020   Last Visit Date: 10/21/2020    Hemoglobin A1C (%)   Date Value   07/27/2020 6.3   03/25/2020 7.6   11/20/2019 6.5             ( goal A1C is < 7)   BP Readings from Last 3 Encounters:   10/21/20 122/80   07/27/20 116/72   03/25/20 122/78          (goal 120/80)  BUN   Date Value Ref Range Status   10/21/2020 10 6 - 20 mg/dL Final     CREATININE   Date Value Ref Range Status   10/21/2020 0.72 0.50 - 0.90 mg/dL Final     Potassium   Date Value Ref Range Status   10/21/2020 4.4 3.7 - 5.3 mmol/L Final

## 2020-11-25 LAB
AVERAGE GLUCOSE: NORMAL
HBA1C MFR BLD: 6.9 %

## 2020-12-07 ENCOUNTER — OFFICE VISIT (OUTPATIENT)
Dept: FAMILY MEDICINE CLINIC | Age: 30
End: 2020-12-07
Payer: MEDICAID

## 2020-12-07 VITALS
DIASTOLIC BLOOD PRESSURE: 80 MMHG | WEIGHT: 181 LBS | SYSTOLIC BLOOD PRESSURE: 120 MMHG | OXYGEN SATURATION: 97 % | HEIGHT: 63 IN | TEMPERATURE: 97.8 F | HEART RATE: 94 BPM | BODY MASS INDEX: 32.07 KG/M2

## 2020-12-07 PROBLEM — M72.2 PLANTAR FASCIITIS: Status: ACTIVE | Noted: 2020-12-07

## 2020-12-07 PROBLEM — Z80.0 FAMILY HISTORY OF LYNCH SYNDROME: Status: ACTIVE | Noted: 2020-12-07

## 2020-12-07 PROBLEM — S93.422A SPRAIN OF DELTOID LIGAMENT OF LEFT ANKLE: Status: ACTIVE | Noted: 2020-12-07

## 2020-12-07 PROCEDURE — 3044F HG A1C LEVEL LT 7.0%: CPT | Performed by: FAMILY MEDICINE

## 2020-12-07 PROCEDURE — G8417 CALC BMI ABV UP PARAM F/U: HCPCS | Performed by: FAMILY MEDICINE

## 2020-12-07 PROCEDURE — 2022F DILAT RTA XM EVC RTNOPTHY: CPT | Performed by: FAMILY MEDICINE

## 2020-12-07 PROCEDURE — G8427 DOCREV CUR MEDS BY ELIG CLIN: HCPCS | Performed by: FAMILY MEDICINE

## 2020-12-07 PROCEDURE — 1036F TOBACCO NON-USER: CPT | Performed by: FAMILY MEDICINE

## 2020-12-07 PROCEDURE — 99214 OFFICE O/P EST MOD 30 MIN: CPT | Performed by: FAMILY MEDICINE

## 2020-12-07 PROCEDURE — G8482 FLU IMMUNIZE ORDER/ADMIN: HCPCS | Performed by: FAMILY MEDICINE

## 2020-12-07 RX ORDER — LIDOCAINE 40 MG/G
CREAM TOPICAL
Qty: 45 G | Refills: 3 | Status: SHIPPED | OUTPATIENT
Start: 2020-12-07 | End: 2020-12-16

## 2020-12-07 ASSESSMENT — ENCOUNTER SYMPTOMS
ABDOMINAL DISTENTION: 0
DIARRHEA: 0
CONSTIPATION: 0
WHEEZING: 0
RHINORRHEA: 0
COUGH: 0
BACK PAIN: 0
SHORTNESS OF BREATH: 0

## 2020-12-07 NOTE — PATIENT INSTRUCTIONS
Patient Education        Plantar Fasciitis: Exercises  Introduction  Here are some examples of exercises for you to try. The exercises may be suggested for a condition or for rehabilitation. Start each exercise slowly. Ease off the exercises if you start to have pain. You will be told when to start these exercises and which ones will work best for you. How to do the exercises  Towel stretch   1. Sit with your legs extended and knees straight. 2. Place a towel around your foot just under the toes. 3. Hold each end of the towel in each hand, with your hands above your knees. 4. Pull back with the towel so that your foot stretches toward you. 5. Hold the position for at least 15 to 30 seconds. 6. Repeat 2 to 4 times a session, up to 5 sessions a day. Calf stretch   This exercise stretches the muscles at the back of the lower leg (the calf) and the Achilles tendon. Do this exercise 3 or 4 times a day, 5 days a week. 1. Stand facing a wall with your hands on the wall at about eye level. Put the leg you want to stretch about a step behind your other leg. 2. Keeping your back heel on the floor, bend your front knee until you feel a stretch in the back leg. 3. Hold the stretch for 15 to 30 seconds. Repeat 2 to 4 times. Plantar fascia and calf stretch   Stretching the plantar fascia and calf muscles can increase flexibility and decrease heel pain. You can do this exercise several times each day and before and after activity. 1. Stand on a step as shown above. Be sure to hold on to the banister. 2. Slowly let your heels down over the edge of the step as you relax your calf muscles. You should feel a gentle stretch across the bottom of your foot and up the back of your leg to your knee. 3. Hold the stretch about 15 to 30 seconds, and then tighten your calf muscle a little to bring your heel back up to the level of the step. Repeat 2 to 4 times.     Towel curls   Make this exercise more challenging by placing a weighted object, such as a soup can, on the other end of the towel. 1. While sitting, place your foot on a towel on the floor and scrunch the towel toward you with your toes. 2. Then, also using your toes, push the towel away from you. [place] pickups   1. Put marbles on the floor next to a cup.  2. Using your toes, try to lift the marbles up from the floor and put them in the cup. Follow-up care is a key part of your treatment and safety. Be sure to make and go to all appointments, and call your doctor if you are having problems. It's also a good idea to know your test results and keep a list of the medicines you take. Where can you learn more? Go to [email or web address] and sign in to your SNOBSWAP account. Enter R722 in the [employer] box to learn more about \"Plantar Fasciitis: Exercises. \"     If you do not have an account, please click on the \"Sign Up Now\" link. Current as of: March 2, 2020               Content Version: 12.6  © 9143-6645 [employer], Exclusively.in. Care instructions adapted under license by [facility] ([facility]). If you have questions about a medical condition or this instruction, always ask your healthcare professional. [address] any warranty or liability for your use of this information. Patient Education        Ankle: Exercises  Introduction  Here are some examples of exercises for you to try. The exercises may be suggested for a condition or for rehabilitation. Start each exercise slowly. Ease off the exercises if you start to have pain. You will be told when to start these exercises and which ones will work best for you. Alphabet exercise  \"Alphabet\" exercise   1. Trace the alphabet with your toe. This helps your ankle move in all directions. Side-to-side knee swing exercise   1. Sit in a chair with your foot flat on the floor.   2. Slowly move your knee from side to side while keeping your foot pressed [email or web address] and sign in to your DataCrowd account. Enter L434 in the [facility] box to learn more about \"Ankle: Exercises. \"     If you do not have an account, please click on the \"Sign Up Now\" link. Current as of: March 2, 2020               Content Version: 12.6  © 1924-3879 [employer]. Care instructions adapted under license by [employer]. If you have questions about a medical condition or this instruction, always ask your healthcare professional. [address] any warranty or liability for your use of this information.

## 2020-12-07 NOTE — PROGRESS NOTES
Chief Complaint   Patient presents with    Diabetes     follow up          [address]  here today for follow up on chronic medical problems, go over labs and/or diagnostic studies, and medication refills. Diabetes (follow up )      HPI: Patient is here for follow-up on diabetes controlled follows with endocrinologist Dr. Joe Perez. Sugars are doing good between 1 10-1 20, last A1c was 6.5 as per patient. Hypothyroidism stable on Synthroid reports she had recent TSH done. Asthma stable on current treatment denies any recent exacerbations. Patient had blood work done all discussed with patient. Patient complains of left ankle pain started 3 months before after she sprained her ankle. Patient reports she had mild swelling the pain and she did not had any x-rays done. The swelling went down. But since then it is hurting on and off, ibuprofen is helping relieving her pain. She runs every day about 2 to 2.5 miles, reports it is hurting during running. She denies any numbness and tingling. The pain is about 5-6 on scale, radiating to left side of the foot, worsening with running and movement. She got new shoes reports that is helping. She also has pain on the bottom of the foot and gets worse in the morning and improves during the day. She denies has never seen podiatrist.      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 /80   Pulse 94   Temp 97.8 °F (36.6 °C)   Ht 5' 3\" (1.6 m)   Wt 181 lb (82.1 kg)   SpO2 97%   BMI 32.06 kg/m²    Body mass index is 32.06 kg/m². Wt Readings from Last 3 Encounters:   12/07/20 181 lb (82.1 kg)   10/21/20 181 lb (82.1 kg)   07/27/20 177 lb (80.3 kg)        [x]Negative depression screening.   PHQ Scores 10/21/2020 1/8/2018   PHQ2 Score 0 4   PHQ9 Score 0 22      []1-4 = Minimal depression   []5-9 = Milddepression   []10-14 = Moderate depression   []15-19 = Moderately severe depression   []20-27 = Severe depression    Discussed testing with the patient and all questions fully answered. Hospital Outpatient Visit on 10/21/2020   Component Date Value Ref Range Status    Microalb, Ur 10/21/2020 <12  <21 mg/L Final    Creatinine, Ur 10/21/2020 76.6  28.0 - 217.0 mg/dL Final    Microalb/Crt. Ratio 10/21/2020 CANNOT BE CALCULATED  <25 mcg/mg creat Final    Glucose 10/21/2020 66* 70 - 99 mg/dL Final    BUN 10/21/2020 10  6 - 20 mg/dL Final    CREATININE 10/21/2020 0.72  0.50 - 0.90 mg/dL Final    Bun/Cre Ratio 10/21/2020 NOT REPORTED  9 - 20 Final    Calcium 10/21/2020 9.4  8.6 - 10.4 mg/dL Final    Sodium 10/21/2020 141  135 - 144 mmol/L Final    Potassium 10/21/2020 4.4  3.7 - 5.3 mmol/L Final    Chloride 10/21/2020 103  98 - 107 mmol/L Final    CO2 10/21/2020 30  20 - 31 mmol/L Final    Anion Gap 10/21/2020 8* 9 - 17 mmol/L Final    Alkaline Phosphatase 10/21/2020 88  35 - 104 U/L Final    ALT 10/21/2020 18  5 - 33 U/L Final    AST 10/21/2020 21  <32 U/L Final    Total Bilirubin 10/21/2020 0.39  0.3 - 1.2 mg/dL Final    Total Protein 10/21/2020 7.2  6.4 - 8.3 g/dL Final    Alb 10/21/2020 4.6  3.5 - 5.2 g/dL Final    Albumin/Globulin Ratio 10/21/2020 NOT REPORTED  1.0 - 2.5 Final    GFR Non- 10/21/2020 >60  >60 mL/min Final    GFR  10/21/2020 >60  >60 mL/min Final    GFR Comment 10/21/2020        Final    Comment: Average GFR for 20-28 years old:   116 mL/min/1.73sq m  Chronic Kidney Disease:   <60 mL/min/1.73sq m  Kidney failure:   <15 mL/min/1.73sq m              eGFR calculated using average adult body mass.  Additional eGFR calculator available at:        [employer]            GFR Staging 10/21/2020 NOT REPORTED   Final    Cholesterol 10/21/2020 166  <200 mg/dL Final    Comment:    Cholesterol Guidelines:      <200 Desirable   200-240  Borderline      >240  Undesirable         HDL 10/21/2020 80  >40 mg/dL Final    Comment:    HDL Guidelines:    <40     Undesirable   40-59    Borderline    >59     Desirable         LDL Cholesterol 10/21/2020 80  0 - 130 mg/dL Final    Comment:    LDL Guidelines:     <100    Desirable   100-129   Near to/above Desirable   130-159   Borderline      >159   Undesirable     Direct (measured) LDL and calculated LDL are not interchangeable tests.  Chol/HDL Ratio 10/21/2020 2.1  <5 Final            Triglycerides 10/21/2020 29  <150 mg/dL Final    Comment:    Triglyceride Guidelines:     <150   Desirable   150-199  Borderline   200-499  High     >499   Very high   Based on AHA Guidelines for fasting triglyceride, October 2012.  VLDL 10/21/2020 NOT REPORTED  1 - 30 mg/dL Final    Hep B S Ab 10/21/2020 471.20* <10 mIU/mL Final    Comment:       REFERENCE RANGE:  <10.0      NON-REACTIVE/NOT IMMUNE  >=10.0     REACTIVE/IMMUNE        The presence of Anti-HBs usually indicates recovery from acute or chronic HBV infection or   acquired immunity from HBV vaccination. Positive results (quantitative levels of equal to or greater than 10.0 mIU/mL) indicate an   adequate immunity from previous infection, vaccination or immune globulin adminstration. Anti-HBc would help define positivity due to Hepatitis B infection.       VARICELLA ZOSTER AB IGG 10/21/2020 1.38  >1.09 Final    Comment:    Interpretation:  IMMUNE     Reference Range:  <0.91         Not Immune  0.91-1.09     Equivocal  >1.09         Immune         WBC 10/21/2020 6.3  3.5 - 11.0 k/uL Final    RBC 10/21/2020 4.98  4.0 - 5.2 m/uL Final    Hemoglobin 10/21/2020 15.8  12.0 - 16.0 g/dL Final    Hematocrit 10/21/2020 46.2* 36 - 46 % Final    MCV 10/21/2020 92.7  80 - 100 fL Final    MCH 10/21/2020 31.7  26 - 34 pg Final    MCHC 10/21/2020 34.2  31 - 37 g/dL Final    RDW 10/21/2020 12.1  11.5 - 14.9 % Final    Platelets CHOLHDLRATIO 1.9 01/08/2018       Lab Results   Component Value Date    LABA1C 6.3 07/27/2020       No results found for: [id number]    No results found for: FOLATE    No results found for: IRON, TIBC, FERRITIN    Lab Results   Component Value Date    VITD25 14.9 (L) 04/07/2017             Current Outpatient Medications   Medication Sig Dispense Refill    lidocaine (LMX) 4 % cream Apply topically every 8 hrs as needed for pain 45 g 3    insulin lispro (ADMELOG) 100 UNIT/ML injection vial INJECT 25 UNITS SUBCUTANEOUSLY THREE TIMES DAILY BEFORE MEALS 90 mL 0    busPIRone (BUSPAR) 15 MG tablet       levothyroxine (SYNTHROID) 125 MCG tablet TAKE 1 TABLET BY MOUTH ONCE DAILY 90 tablet 0    albuterol sulfate HFA (VENTOLIN HFA) 108 (90 Base) MCG/ACT inhaler Inhale 2 puffs into the lungs every 6 hours as needed for Wheezing 1 Inhaler 3    docusate sodium (EQUATE STOOL SOFTENER) 100 MG capsule TAKE 1 CAPSULE BY MOUTH TWICE DAILY AS NEEDED FOR  CONSTIPATION 60 capsule 1    Insulin Degludec (TRESIBA) 100 UNIT/ML SOLN 18  UNITS DAILY ONCE DAILY 2 vial 0    methylphenidate (METADATE CD) 20 MG extended release capsule       atorvastatin (LIPITOR) 20 MG tablet One a day 90 tablet 0    Insulin Syringe-Needle U-100 ([employer]ER INSULIN SYR 0.3CC/30G) 30G X 5/16\" 0.3 ML MISC Use as directed 100 each 0    loratadine (CLARITIN) 10 MG tablet Take 1 tablet by mouth daily 30 tablet 0    Accu-Chek Multiclix Lancets MISC 3 daily.  give  Covered by insurance 100 each 5     MG CAPS   1    Alpha-Lipoic Acid 100 MG CAPS       B Complex Vitamins (B COMPLEX 100 PO)       Probiotic Product (PROBIOTIC-10 PO)       Cholecalciferol (VITAMIN D3) 25045 units TABS       venlafaxine (EFFEXOR XR) 75 MG extended release capsule Take 125 mg by mouth daily       lamoTRIgine (LAMICTAL) 100 MG tablet Take 100 mg by mouth daily      ibuprofen (ADVIL;MOTRIN) 800 MG tablet Take 1 tablet by mouth 3 times daily (with meals) (Patient not taking: Reported on 2020) 60 tablet 0     Current Facility-Administered Medications   Medication Dose Route Frequency Provider Last Rate Last Dose    levonorgestrel (MIRENA) IUD 52 mg 1 each  1 each Intrauterine Once [place], DO   1 each at 17 1057             Social History     Socioeconomic History    Marital status: Single     Spouse name: Not on file    Number of children: Not on file    Years of education: Not on file    Highest education level: Not on file   Occupational History    Not on file   Social Needs    Financial resource strain: Not on file    Food insecurity     Worry: Not on file     Inability: Not on file   [employer] needs     Medical: Not on file     Non-medical: Not on file   Tobacco Use    Smoking status: Former Smoker     Packs/day: 0.50     Years: 3.00     Pack years: 1.50     Types: Cigarettes     Last attempt to quit: 2016     Years since quittin.2    Smokeless tobacco: Never Used   Substance and Sexual Activity    Alcohol use:  Yes     Alcohol/week: 1.0 standard drinks     Types: 1 Standard drinks or equivalent per week     Comment: ooccasionally    Drug use: Yes     Types: Marijuana     Comment: DAILY    Sexual activity: Yes   Lifestyle    Physical activity     Days per week: Not on file     Minutes per session: Not on file    Stress: Not on file   Relationships    Social connections     Talks on phone: Not on file     Gets together: Not on file     Attends Yarsanism service: Not on file     Active member of club or organization: Not on file     Attends meetings of clubs or organizations: Not on file     Relationship status: Not on file    Intimate partner violence     Fear of current or ex partner: Not on file     Emotionally abused: Not on file     Physically abused: Not on file     Forced sexual activity: Not on file   Other Topics Concern    Not on file   Social History Narrative    Not on file     Counseling given: Not Answered        Family Normal heart sounds. Pulmonary:      Effort: Pulmonary effort is normal.      Breath sounds: Normal breath sounds. Abdominal:      General: Bowel sounds are normal.      Palpations: Abdomen is soft. There is no mass. Tenderness: There is no abdominal tenderness. Hernia: No hernia is present. Musculoskeletal:      Left ankle: She exhibits decreased range of motion. She exhibits no swelling, no deformity and normal pulse. Tenderness. Lateral malleolus tenderness found. Achilles tendon exhibits pain. Achilles tendon exhibits normal Sampson's test results. Right foot: Normal range of motion. No deformity. Left foot: Decreased range of motion. Feet:    Feet:      Right foot:      Protective Sensation: 5 sites tested. 5 sites sensed. Skin integrity: Skin integrity normal.      Toenail Condition: Right toenails are normal.      Left foot:      Protective Sensation: 5 sites tested. 5 sites sensed. Skin integrity: Skin integrity normal.      Toenail Condition: Left toenails are normal.   Neurological:      Mental Status: She is alert and oriented to person, place, and time. Cranial Nerves: Cranial nerves are intact. Sensory: Sensation is intact. Motor: Motor function is intact. Coordination: Coordination is intact. Psychiatric:         Attention and Perception: Attention and perception normal.         Speech: Speech normal.         Behavior: Behavior normal.             ASSESSMENT AND PLAN      1. Type 1 diabetes mellitus without complication ([address]  Controlled follow-up with endocrinologist continue same medications we will get A1c results. 2. Sprain of deltoid ligament of left ankle, subsequent encounter  Stable continue same medications  - lidocaine (LMX) 4 % cream; Apply topically every 8 hrs as needed for pain  Dispense: 45 g; Refill: 3  - XR ANKLE LEFT (2 VIEWS); [facility] [address] [facility] [place]    3.  Mild intermittent asthma without complication  Stable only takes albuterol as needed    4. Acquired hypothyroidism  Continue Synthroid repeat TSH  - TSH without Reflex; Future    5. Plantar fasciitis  Likely plantar fasciitis and ligamental strain, conservative treatment x-ray left ankle, handout provided for exercises. Referral to podiatrist  - lidocaine (LMX) 4 % cream; Apply topically every 8 hrs as needed for pain  Dispense: 45 g; Refill: 3  - XR ANKLE LEFT (2 VIEWS); Future  - [address] [facility], [address], [place] [address], [place], [place]    6. Family history of Clifton syndrome  Discussed with patient not able to do testing, as it is related to multiple genes, schedule appointment with  to order this testing. Orders Placed This Encounter   Procedures    XR ANKLE LEFT (2 VIEWS)     Standing Status:   Future     Standing Expiration Date:   12/7/2021     Order Specific Question:   Reason for exam:     Answer:   injury    TSH without Reflex     Standing Status:   Future     Standing Expiration Date:   12/4/2021   [address] [place], [place] [address]JED DPM, Podiatry, [place]     Referral Priority:   Routine     Referral Type:   Eval and Treat     Referral Reason:   Specialty Services Required     Referred to Provider:   Sultana Alexander DPM     Requested Specialty:   Podiatry     Number of Visits Requested:   1         There are no discontinued medications. Tesha Salazar received counseling on the following healthy behaviors: nutrition, exercise and medication adherence  Reviewed prior labs and health maintenance  Continue current medications, diet and exercise. Discussed use, benefit, and side effects of prescribed medications. Barriers to medication compliance addressed. Patient given educational materials - see patient instructions  Was a self-tracking handout given in paper form or via [employer]?  Yes    Requested Prescriptions     Signed Prescriptions Disp Refills    lidocaine (LMX) 4 % cream 45 g 3     Sig: Apply topically every 8 hrs as needed for pain       All patient questions answered. Patient voiced understanding. Quality Measures    Body mass index is 32.06 kg/m². Elevated. Weight control planned discussed daily exercise regimen and Healthy diet and regular exercise. BP: 120/80 Blood pressure is normal. Treatment plan consists of No treatment change needed. Lab Results   Component Value Date    LDLCHOLESTEROL 80 10/21/2020    (goal LDL reduction with dx if diabetes is 50% LDL reduction)      PHQ Scores 10/21/2020 1/8/2018   PHQ2 Score 0 4   PHQ9 Score 0 22     Interpretation of Total Score Depression Severity: 1-4 = Minimal depression, 5-9 = Mild depression, 10-14 = Moderate depression, 15-19 = Moderately severe depression, 20-27 = Severe depression    The patient'spast medical, surgical, social, and family history as well as her   current medications and allergies were reviewed as documented in today's encounter. Medications, labs, diagnostic studies, consultations andfollow-up as documented in this encounter. No follow-ups on file. Patient wasseen with total face to face time of 25 minutes. More than 50% of this visit was counseling and education. Future Appointments   Date Time Provider Georgina Nguyen   3/8/2021  2:15 PM MD mars Lange     This note was completed by using the assistance of a speech-recognition program. However, inadvertent computerized transcription errors may be present. Althoughevery effort was made to ensure accuracy, no guarantees can be provided that every mistake has been identified and corrected by editing.   Electronically signed by Radha Moon MD on 12/7/2020  1:10 PM

## 2020-12-07 NOTE — PROGRESS NOTES
Visit Information    Have you changed or started any medications since your last visit including any over-the-counter medicines, vitamins, or herbal medicines? no   Are you having any side effects from any of your medications? -  no  Have you stopped taking any of your medications? Is so, why? -  no    Have you seen any other physician or provider since your last visit? No  Have you had any other diagnostic tests since your last visit? No  Have you been seen in the emergency room and/or had an admission to a hospital since we last saw you? No  Have you had your routine dental cleaning in the past 6 months? yes    Have you activated your [employer] account? If not, what are your barriers?  Yes     Patient Care Team:  Issa Tan MD as PCP - General (Family Medicine)  Issa Tan MD as PCP - [facility]    Medical History Review  Past Medical, Family, and Social History reviewed and does contribute to the patient presenting condition    Health Maintenance   Topic Date Due    Diabetic foot exam  12/30/2001    Diabetic retinal exam  12/30/2001    TSH testing  11/20/2020    A1C test (Diabetic or Prediabetic)  07/27/2021    Diabetic microalbuminuria test  10/21/2021    Lipid screen  10/21/2021    Cervical cancer screen  08/07/2022    DTaP/Tdap/Td vaccine (2 - Td) 04/07/2027    Flu vaccine  Completed    HIV screen  Completed    Hepatitis A vaccine  Aged Out    Hib vaccine  Aged Out    Meningococcal (ACWY) vaccine  Aged Out    Pneumococcal 0-64 years Vaccine  Aged Out    Hepatitis B vaccine  Discontinued    Varicella vaccine  Discontinued

## 2020-12-16 ENCOUNTER — OFFICE VISIT (OUTPATIENT)
Dept: PODIATRY | Age: 30
End: 2020-12-16
Payer: MEDICAID

## 2020-12-16 VITALS — WEIGHT: 180 LBS | HEIGHT: 63 IN | BODY MASS INDEX: 31.89 KG/M2

## 2020-12-16 PROCEDURE — G8427 DOCREV CUR MEDS BY ELIG CLIN: HCPCS | Performed by: PODIATRIST

## 2020-12-16 PROCEDURE — 99203 OFFICE O/P NEW LOW 30 MIN: CPT | Performed by: PODIATRIST

## 2020-12-16 PROCEDURE — G8417 CALC BMI ABV UP PARAM F/U: HCPCS | Performed by: PODIATRIST

## 2020-12-16 PROCEDURE — 1036F TOBACCO NON-USER: CPT | Performed by: PODIATRIST

## 2020-12-16 PROCEDURE — G8482 FLU IMMUNIZE ORDER/ADMIN: HCPCS | Performed by: PODIATRIST

## 2020-12-16 NOTE — PROGRESS NOTES
[facility]  New Patient History and Physical    Chief Complaint:   Chief Complaint   Patient presents with    Ankle Injury     patient sprained left ankle a few months ago, still having pain        HPI: Blake Hampton is a 30 y.o. female who presents to the office today complaining of left foot and ankle pain. She states that she twisted her left ankle about 6 months ago. Still having some pain on the side of her foot and ankle. But her main area of pain is in the left arch. Pain after rest sometimes. She likes to walk/run, pain after a walking for a period of time. Bought [employer], these helped. When her arch starts hurting, she feels like she walks differently and then her ankle bothers her. Currently denies F/C/N/V. Allergies   Allergen Reactions    Hydrocortisone Itching     Per pt  States worsens her eczema       Past Medical History:   Diagnosis Date    Anxiety     Asthma     Depression     Diabetes ([facility] [address]     Eczema     Herniated cervical disc     Hyperlipidemia     Hypothyroidism        Prior to Admission medications    Medication Sig Start Date End Date Taking?  Authorizing Provider   insulin lispro (ADMELOG) 100 UNIT/ML injection vial INJECT 25 UNITS SUBCUTANEOUSLY THREE TIMES DAILY BEFORE MEALS 11/4/20  Yes Fletcher Solano MD   busPIRone (BUSPAR) 15 MG tablet  10/7/20  Yes Historical Provider, MD   levothyroxine (SYNTHROID) 125 MCG tablet TAKE 1 TABLET BY MOUTH ONCE DAILY 10/21/20  Yes Fletcher Solano MD   albuterol sulfate HFA (VENTOLIN HFA) 108 (90 Base) MCG/ACT inhaler Inhale 2 puffs into the lungs every 6 hours as needed for Wheezing 10/21/20  Yes Fletcher Solano MD   docusate sodium (EQUATE STOOL SOFTENER) 100 MG capsule TAKE 1 CAPSULE BY MOUTH TWICE DAILY AS NEEDED FOR  CONSTIPATION 10/21/20  Yes Fletcher Solano MD   Insulin Degludec (TRESIBA) 100 UNIT/ML SOLN 18  UNITS DAILY ONCE DAILY 10/21/20  Yes Fletcher Solano MD methylphenidate (METADATE CD) 20 MG extended release capsule  20  Yes Historical Provider, MD   atorvastatin (LIPITOR) 20 MG tablet One a day 20  Yes Grover Newman MD   Insulin Syringe-Needle U-100 ([employer]ER INSULIN SYR 0.3CC/30G) 30G X \" 0.3 ML MISC Use as directed 20  Yes Grover Newman MD   loratadine (CLARITIN) 10 MG tablet Take 1 tablet by mouth daily 20  Yes Grover Newman MD   Accu-Chek Multiclix Lancets MISC 3 daily. give  Covered by insurance 3/25/20  Yes Grover Newman MD    MG CAPS  19  Yes Historical Provider, MD   Alpha-Lipoic Acid 100 MG CAPS    Yes Historical Provider, MD   B Complex Vitamins (B COMPLEX 100 PO)    Yes Historical Provider, MD   Cholecalciferol (VITAMIN D3) 12866 units TABS    Yes Historical Provider, MD   venlafaxine (EFFEXOR XR) 75 MG extended release capsule Take 125 mg by mouth daily    Yes Historical Provider, MD   lamoTRIgine (LAMICTAL) 100 MG tablet Take 100 mg by mouth daily   Yes Historical Provider, MD       Past Surgical History:   Procedure Laterality Date   [address];  for Brother    CYST REMOVAL      LOWER LIP    NM COLONOSCOPY W/BIOPSY SINGLE/MULTIPLE N/A 10/6/2017    COLONOSCOPY performed by Renard Fenton DO at [address]         Family History   Problem Relation Age of Onset    Other Mother     Other Father     Cervical Cancer Maternal Grandmother     Cancer Maternal Grandmother         COLON    Diabetes Paternal Grandfather        Social History     Tobacco Use    Smoking status: Former Smoker     Packs/day: 0.50     Years: 3.00     Pack years: 1.50     Types: Cigarettes     Quit date: 2016     Years since quittin.2    Smokeless tobacco: Never Used   Substance Use Topics    Alcohol use:  Yes     Alcohol/week: 1.0 standard drinks     Types: 1 Standard drinks or equivalent per week     Comment: ooccasionally       Review of Systems Constitutional: Negative for chills, diaphoresis, fatigue, fever and unexpected weight change. Cardiovascular: Negative for leg swelling. Gastrointestinal: Negative for constipation, diarrhea, nausea and vomiting. Musculoskeletal: Positive for gait problem. Negative for arthralgias and joint swelling. Skin: Negative for color change, pallor, rash and wound. Neurological: Negative for weakness and numbness. Lower Extremity Physical Examination:     Vitals: There were no vitals filed for this visit. General: AAO x 3 in NAD. Vascular: DP and PT pulses palpable 2/4, Bilateral.  CFT <3 seconds, Bilateral.  Hair growth present to the level of the digits, Bilateral.  Edema absent, Left. Varicosities absent, Bilateral. Erythema absent, Left    Neurological:  Sensation present to light touch to level of digits, Bilateral.    Musculoskeletal: Muscle strength 5/5, Left. Pain present upon palpation of medial and central bands of the plantar fascia, no pain lateral ankle, no pain lateral foot, no pain 5th met, no pain lateral malleolus, no pain left heel, no pain with ROM left ankle or STJ, no pain with muscle strength testing, Left. normal medial longitudinal arch, Bilateral.  Ankle ROM within normal limits,Left. Integument: Warm, dry, supple, Bilateral.  Open lesion absent, Bilateral.      Radiographs: 3 views left Ankle: Three weightbearing views (AP, Mortise, and Lateral) of the left ankle  were obtained in the office today and reviewed, revealing no acute fracture, dislocation, or radioopaque foreign body/tumor. The ankle mortise is maintained with no widening of the clear spaces. Overall alignment is satisfactory. Asessment: Patient is a 30 y.o. female with:   1. Acute left ankle pain    2. Plantar fasciitis    3. Plantar fasciitis, left    4. Enthesopathy of left foot    5.  Sprain of foot, left, initial encounter 6. Sprain of anterior talofibular ligament of left ankle, initial encounter          Plan: Patient examined and evaluated. Current condition and treatment options discussed in detail. Advised pt to continue with good shoes. Verbal and written instructions given to patient. Contact office with any questions/problems/concerns. Order for custom orthotics. I feel that these appliances are medically necessary for my patients well being and in my opinion are both reasonable and necessary to the accepted medical practice in the treatment of the above conditions. Custom molded orthotics prevent the over pronation which is leading to excessive tension of the plantar fascia. Abnormal foot/leg functions demands mechanical, functional protections and control. Without custom molded orthotics, the patient may develop chronic pain in her feet. Later on in life, the patient may develop ankle, shin, knee and hip pain as well. In trial usage of felt pads with Lo-Dye ankle strapping to mimic the effects of the orthotics, the patient responded with reduced symptoms and better foot function. Description of the devices: These devices are Root biomechanical orthotic devices made of a plastic polymer with a leather or Spenco-type top cover. These appliances control biomechanical aberrations of the patients feet and accommodate painful areas. Orthotics are not intended to be worn in lieu of shoes, but are removable devices formed from casts of the patients feet and then sent to an independent laboratory for fabrication.

## 2020-12-17 ASSESSMENT — ENCOUNTER SYMPTOMS
NAUSEA: 0
VOMITING: 0
DIARRHEA: 0
CONSTIPATION: 0
COLOR CHANGE: 0

## 2021-03-10 ENCOUNTER — TELEMEDICINE (OUTPATIENT)
Dept: FAMILY MEDICINE CLINIC | Age: 31
End: 2021-03-10
Payer: MEDICAID

## 2021-03-10 DIAGNOSIS — K62.5 RECTAL BLEEDING: ICD-10-CM

## 2021-03-10 DIAGNOSIS — E03.9 ACQUIRED HYPOTHYROIDISM: ICD-10-CM

## 2021-03-10 DIAGNOSIS — E10.9 TYPE 1 DIABETES MELLITUS WITHOUT COMPLICATION (HCC): Primary | ICD-10-CM

## 2021-03-10 DIAGNOSIS — K59.00 CONSTIPATION, UNSPECIFIED CONSTIPATION TYPE: ICD-10-CM

## 2021-03-10 DIAGNOSIS — J45.20 MILD INTERMITTENT ASTHMA WITHOUT COMPLICATION: ICD-10-CM

## 2021-03-10 DIAGNOSIS — Z80.0 FAMILY HISTORY OF LYNCH SYNDROME: ICD-10-CM

## 2021-03-10 PROCEDURE — 2022F DILAT RTA XM EVC RTNOPTHY: CPT | Performed by: FAMILY MEDICINE

## 2021-03-10 PROCEDURE — G8427 DOCREV CUR MEDS BY ELIG CLIN: HCPCS | Performed by: FAMILY MEDICINE

## 2021-03-10 PROCEDURE — 3046F HEMOGLOBIN A1C LEVEL >9.0%: CPT | Performed by: FAMILY MEDICINE

## 2021-03-10 PROCEDURE — 99214 OFFICE O/P EST MOD 30 MIN: CPT | Performed by: FAMILY MEDICINE

## 2021-03-10 RX ORDER — HYDROCORTISONE 25 MG/G
CREAM TOPICAL
Status: CANCELLED | OUTPATIENT
Start: 2021-03-10

## 2021-03-10 RX ORDER — ATORVASTATIN CALCIUM 20 MG/1
TABLET, FILM COATED ORAL
Qty: 90 TABLET | Refills: 0 | Status: SHIPPED | OUTPATIENT
Start: 2021-03-10 | End: 2021-06-21

## 2021-03-10 RX ORDER — BLOOD SUGAR DIAGNOSTIC
STRIP MISCELLANEOUS
Qty: 100 EACH | Refills: 0 | Status: SHIPPED | OUTPATIENT
Start: 2021-03-10

## 2021-03-10 RX ORDER — DOCUSATE SODIUM 100 MG/1
CAPSULE, LIQUID FILLED ORAL
Qty: 60 CAPSULE | Refills: 1 | Status: SHIPPED | OUTPATIENT
Start: 2021-03-10 | End: 2021-07-21 | Stop reason: SDUPTHER

## 2021-03-10 ASSESSMENT — ENCOUNTER SYMPTOMS
CHEST TIGHTNESS: 0
DIARRHEA: 0
BLOOD IN STOOL: 0
PHOTOPHOBIA: 0
VOMITING: 0
ANAL BLEEDING: 1
SINUS PAIN: 0
FACIAL SWELLING: 0
ABDOMINAL DISTENTION: 0
WHEEZING: 0
SHORTNESS OF BREATH: 0
BACK PAIN: 0
NAUSEA: 0
ABDOMINAL PAIN: 0
SORE THROAT: 0
COUGH: 0
RHINORRHEA: 0

## 2021-03-10 NOTE — PROGRESS NOTES
[address] [address]  Phone: [phone], Fax: [phone]    TELEHEALTH EVALUATION -- Audio/Visual (During DUFFT-47 public health emergency)    Patient ID verified by me prior to start of this visit    Toña Grossman (:  1990) has requested an audio/video evaluation for the following concern(s):  Chief Complaint   Patient presents with    Diabetes      HPI:  [id number] Cha[address]ce[address] is an established patient of Karyna Zamora MD  . Patient has a history of diabetes controlled follows with endocrinologist.  Patient denies any hypo or hyperglycemic episodes. Patient is on insulin last A1c is below 7. Patient reports she has seen ophthalmologist recently. Patient is concerned about constipation reports she is constipated most of the days and also has history of hemorrhoids. She had colonoscopy done 5 years before and that showed hemorrhoids. Patient had recent episode of rectal bleeding. Patient reports she wants to have colonoscopy done again as she had family history of Clifton syndrome. Both her mother and aunt were diagnosed. She does not member who did her colonoscopy in the past.  Previous colonoscopy was in 2017. Hypothyroidism on Synthroid TSH ordered she has not done that. Asthma stable on current treatment, patient denies any shortness of breath wheezing or any recent exacerbations. [x]Negative depression screening. []1-4 = Minimal depression   []5-9 = Mild depression   []10-14 = Moderate depression   []15-19 = Moderately severe depression   []20-27 = Severe depression  PHQ Scores 10/21/2020 2018   PHQ2 Score 0 4   PHQ9 Score 0 22     Review of Systems   Constitutional: Negative for activity change, appetite change, diaphoresis, fatigue and unexpected weight change. HENT: Negative for congestion, ear pain, facial swelling, hearing loss, postnasal drip, rhinorrhea, sinus pain and sore throat.     Eyes: Negative for photophobia and visual disturbance. Respiratory: Negative for cough, chest tightness, shortness of breath and wheezing. Cardiovascular: Negative for chest pain, palpitations and leg swelling. Gastrointestinal: Positive for anal bleeding. Negative for abdominal distention, abdominal pain, blood in stool, diarrhea, nausea and vomiting. Endocrine: Negative for polyphagia and polyuria. Genitourinary: Negative for difficulty urinating, flank pain, frequency, hematuria, pelvic pain and urgency. Musculoskeletal: Negative for arthralgias, back pain, gait problem, joint swelling and myalgias. Allergic/Immunologic: Negative for food allergies. Neurological: Negative for dizziness, speech difficulty, weakness, light-headedness and headaches. Psychiatric/Behavioral: Negative for agitation, decreased concentration, dysphoric mood, sleep disturbance and suicidal ideas. The patient is not nervous/anxious and is not hyperactive.         Patient Active Problem List    Diagnosis Date Noted    Constipation 03/10/2021    Mild intermittent asthma without complication 19/79/7988    Sprain of deltoid ligament of left ankle 12/07/2020    Plantar fasciitis 12/07/2020    Family history of Clifton syndrome 12/07/2020    Mild persistent asthma without complication 93/72/7295    LSIL w/ (-) HPV cotesting 06/27/2017    Mirena IUD 6/27/17 06/27/2017    Oligomenorrhea 05/16/2017    Type 1 diabetes mellitus without complication ([email or web address] 50/85/3241    Depression 11/11/2016    Hypothyroidism 11/11/2016        Past Surgical History:   Procedure Laterality Date    BONE MARROW TRANSPLANT  1994    DONOR;  for Brother    CYST REMOVAL      LOWER LIP    SD COLONOSCOPY W/BIOPSY SINGLE/MULTIPLE N/A 10/6/2017    COLONOS[facility] by Tammie Parker DO at [facility]       Family History   Problem Relation Age of Onset    Other Mother     Other Father     Cervical Cancer Maternal Grandmother     Cancer Maternal Grandmother         COLON    Diabetes Paternal Grandfather      Current Outpatient Medications   Medication Sig Dispense Refill    docusate sodium (EQUATE STOOL SOFTENER) 100 MG capsule TAKE 1 CAPSULE BY MOUTH TWICE DAILY AS NEEDED FOR  CONSTIPATION 60 capsule 1    atorvastatin (LIPITOR) 20 MG tablet One a day 90 tablet 0    Insulin Syringe-Needle U-100 ([employer] INSULIN SYR 0.3CC/30G) 30G X 5/16\" 0.3 ML MISC Use as directed 100 each 0    insulin lispro (ADMELOG) 100 UNIT/ML injection vial INJECT 25 UNITS SUBCUTANEOUSLY THREE TIMES DAILY BEFORE MEALS 90 mL 0    busPIRone (BUSPAR) 15 MG tablet       levothyroxine (SYNTHROID) 125 MCG tablet TAKE 1 TABLET BY MOUTH ONCE DAILY 90 tablet 0    albuterol sulfate HFA (VENTOLIN HFA) 108 (90 Base) MCG/ACT inhaler Inhale 2 puffs into the lungs every 6 hours as needed for Wheezing 1 Inhaler 3    Insulin Degludec (TRESIBA) 100 UNIT/ML SOLN 18  UNITS DAILY ONCE DAILY 2 vial 0    methylphenidate (METADATE CD) 20 MG extended release capsule       loratadine (CLARITIN) 10 MG tablet Take 1 tablet by mouth daily 30 tablet 0    Accu-Chek Multiclix Lancets MISC 3 daily.  give  Covered by insurance 100 each 5     MG CAPS   1    Alpha-Lipoic Acid 100 MG CAPS       B Complex Vitamins (B COMPLEX 100 PO)       Cholecalciferol (VITAMIN D3) 18025 units TABS       venlafaxine (EFFEXOR XR) 75 MG extended release capsule Take 125 mg by mouth daily       lamoTRIgine (LAMICTAL) 100 MG tablet Take 100 mg by mouth daily       Current Facility-Administered Medications   Medication Dose Route Frequency Provider Last Rate Last Admin    levonorgestrel (MIRENA) IUD 52 mg 1 each  1 each Intrauterine Once Marylen Hillock, DO   1 each at 06/27/17 1057       Allergies   Allergen Reactions    Hydrocortisone Itching     Per pt  States worsens her eczema        Social History     Tobacco Use    Smoking status: Former Smoker     Packs/day: 0.50     Years: 10/21/2020     10/21/2020    CO2 30 10/21/2020    BUN 10 10/21/2020    CREATININE 0.72 10/21/2020    GLUCOSE 66 10/21/2020    CALCIUM 9.4 10/21/2020        Due to this being a TeleHealth encounter, evaluation of the following organ systems is limited: Vitals/Constitutional/EENT/Resp/CV/GI//MS/Neuro/Skin/Heme-Lymph-Imm. ASSESSMENT/PLAN:  1. Type 1 diabetes mellitus without complication (HCC)  Controlled continue same medications. Continue  Follow-up with endocrinologist.    2. Constipation, unspecified constipation type  Start on Colace, avoid constipation referral placed to GI for colonoscopy  - docusate sodium (EQUATE STOOL SOFTENER) 100 MG capsule; TAKE 1 CAPSULE BY MOUTH TWICE DAILY AS NEEDED FOR  CONSTIPATION  Dispense: 60 capsule; Refill: 1    3. Acquired hypothyroidism  Stable labs reprinted. Continue Synthroid    4. Rectal bleeding  Patient referred for colonoscopy  - [facility] Screening Colonoscopy  - docusate sodium (EQUATE STOOL SOFTENER) 100 MG capsule; TAKE 1 CAPSULE BY MOUTH TWICE DAILY AS NEEDED FOR  CONSTIPATION  Dispense: 60 capsule; Refill: 1  - Hepatitis C Antibody; Future    5. Family history of Clifton syndrome  Patient has not been going to counseling yet. 6. Mild intermittent asthma without complication  Stable continue albuterol inhaler as needed    Controlled Substance Monitoring:  Acute and Chronic Pain Monitoring:   No flowsheet data found. Orders Placed This Encounter   Procedures    Hepatitis C Antibody     Standing Status:   Future     Standing Expiration Date:   3/10/2022   [facility] Screening Colonoscopy     Referral Priority:   Routine     Referral Type:    Other     Referral Reason:   Specialty Services Required     Number of Visits Requested:   1      Orders Placed This Encounter   Medications    docusate sodium (EQUATE STOOL SOFTENER) 100 MG capsule     Sig: TAKE 1 CAPSULE BY MOUTH TWICE DAILY AS NEEDED FOR  CONSTIPATION     Dispense:  60 capsule     Refill:  1     Please consider 90 day supplies to promote better adherence    atorvastatin (LIPITOR) 20 MG tablet     Sig: One a day     Dispense:  90 tablet     Refill:  0    Insulin Syringe-Needle U-100 ([employer] INSULIN SYR 0.3CC/30G) 30G X 5/16\" 0.3 ML MISC     Sig: Use as directed     Dispense:  100 each     Refill:  0      Medications Discontinued During This Encounter   Medication Reason    docusate sodium (EQUATE STOOL SOFTENER) 100 MG capsule REORDER    atorvastatin (LIPITOR) 20 MG tablet REORDER    Insulin Syringe-Needle U-100 ([employer] INSULIN SYR 0.3CC/30G) 30G X 5/16\" 0.3 ML MISC REORDER      Jayleen received counseling on the following healthy behaviors: nutrition, exercise and medication adherence  Reviewed prior labs and health maintenance. Continue current medications, diet and exercise. Discussed use, benefit, and side effects of prescribed medications. Barriers to medication compliance addressed. Patient given educational materials - see patient instructions. All patient questions answered. Patient voiced understanding. No follow-ups on file. Fiona Duenas is a 35 y.o. female patient  being evaluated by a Virtual Visit (video visit) encounter to address concerns as mentioned above. A caregiver was present when appropriate. Due to this being a TeleHealth encounter (During AIMNJ-04 public health emergency), evaluation of the following organ systems was limited:Vitals/Constitutional/EENT/Resp/CV/GI//MS/Neuro/Skin/Heme-Lymph-Imm. Services were provided through a video synchronous discussion virtually to substitute for in-person clinic visit. This is a telehealth visit that was performed with the originating site at Patient Location: home and provider Location of [address]. Verbal consent to participate in video visit was obtained.  Patient ID verified by me prior to start of this visit  I discussed with the patient the nature of our telehealth visits via interactive/real-time audio/video that:  -

## 2021-04-15 ENCOUNTER — TELEPHONE (OUTPATIENT)
Dept: GASTROENTEROLOGY | Age: 31
End: 2021-04-15

## 2021-05-18 DIAGNOSIS — E03.9 ACQUIRED HYPOTHYROIDISM: ICD-10-CM

## 2021-05-19 RX ORDER — LEVOTHYROXINE SODIUM 0.12 MG/1
TABLET ORAL
Qty: 90 TABLET | Refills: 0 | Status: SHIPPED | OUTPATIENT
Start: 2021-05-19 | End: 2021-07-21 | Stop reason: SDUPTHER

## 2021-05-19 NOTE — TELEPHONE ENCOUNTER
Please Approve or Refuse.   Send to Pharmacy per Pt's Request:     Next Visit Date:  7/21/2021   Last Visit Date: 3/10/2021    Hemoglobin A1C (%)   Date Value   11/25/2020 6.9   07/27/2020 6.3   03/25/2020 7.6             ( goal A1C is < 7)   BP Readings from Last 3 Encounters:   12/07/20 120/80   10/21/20 122/80   07/27/20 116/72          (goal 120/80)  BUN   Date Value Ref Range Status   10/21/2020 10 6 - 20 mg/dL Final     CREATININE   Date Value Ref Range Status   10/21/2020 0.72 0.50 - 0.90 mg/dL Final     Potassium   Date Value Ref Range Status   10/21/2020 4.4 3.7 - 5.3 mmol/L Final

## 2021-06-21 RX ORDER — ATORVASTATIN CALCIUM 20 MG/1
TABLET, FILM COATED ORAL
Qty: 90 TABLET | Refills: 3 | Status: SHIPPED | OUTPATIENT
Start: 2021-06-21

## 2021-07-18 DIAGNOSIS — E10.9 TYPE 1 DIABETES MELLITUS WITHOUT COMPLICATION (HCC): ICD-10-CM

## 2021-07-21 ENCOUNTER — TELEMEDICINE (OUTPATIENT)
Dept: FAMILY MEDICINE CLINIC | Age: 31
End: 2021-07-21
Payer: MEDICAID

## 2021-07-21 DIAGNOSIS — K59.00 CONSTIPATION, UNSPECIFIED CONSTIPATION TYPE: ICD-10-CM

## 2021-07-21 DIAGNOSIS — E10.9 TYPE 1 DIABETES MELLITUS WITHOUT COMPLICATION (HCC): Primary | ICD-10-CM

## 2021-07-21 DIAGNOSIS — J45.20 MILD INTERMITTENT ASTHMA WITHOUT COMPLICATION: ICD-10-CM

## 2021-07-21 DIAGNOSIS — E03.9 ACQUIRED HYPOTHYROIDISM: ICD-10-CM

## 2021-07-21 DIAGNOSIS — K62.5 RECTAL BLEEDING: ICD-10-CM

## 2021-07-21 PROCEDURE — 99213 OFFICE O/P EST LOW 20 MIN: CPT | Performed by: FAMILY MEDICINE

## 2021-07-21 PROCEDURE — G8427 DOCREV CUR MEDS BY ELIG CLIN: HCPCS | Performed by: FAMILY MEDICINE

## 2021-07-21 PROCEDURE — 2022F DILAT RTA XM EVC RTNOPTHY: CPT | Performed by: FAMILY MEDICINE

## 2021-07-21 PROCEDURE — 3046F HEMOGLOBIN A1C LEVEL >9.0%: CPT | Performed by: FAMILY MEDICINE

## 2021-07-21 RX ORDER — LEVOTHYROXINE SODIUM 0.12 MG/1
TABLET ORAL
Qty: 90 TABLET | Refills: 1 | Status: SHIPPED | OUTPATIENT
Start: 2021-07-21 | End: 2022-05-26

## 2021-07-21 RX ORDER — DOCUSATE SODIUM 100 MG/1
CAPSULE, LIQUID FILLED ORAL
Qty: 60 CAPSULE | Refills: 1 | Status: SHIPPED | OUTPATIENT
Start: 2021-07-21 | End: 2021-10-29

## 2021-07-21 ASSESSMENT — ENCOUNTER SYMPTOMS
PHOTOPHOBIA: 0
ABDOMINAL PAIN: 0
WHEEZING: 0
STRIDOR: 0
SHORTNESS OF BREATH: 0
ABDOMINAL DISTENTION: 0
CHEST TIGHTNESS: 0

## 2021-07-21 NOTE — PROGRESS NOTES
[place] [address]  Phone: [phone], Fax: [phone]    TELEHEALTH EVALUATION -- Audio/Visual (During HCLVD-84 public health emergency)    Patient ID verified by me prior to start of this visit    Toña Grossman (:  1990) has requested an audio/video evaluation for the following concern(s):  Chief Complaint   Patient presents with    Diabetes      HPI:  [id number] is an established patient of Shelva Cabot, MD  . Patient has a history of diabetes type 1 controlled, follows with endocrinologist, her recent A1c was 7.5 at endocrinologist office. Patient is on insulin. Reports she has gained weight being on insulin. Patient sugars are running in the range. Patient has seen ophthalmologist results are in care everywhere. Hypothyroidism has not done TSH labs are ordered. Denies any fatigue tiredness. Hypertension controlled. Asthma stable. []Negative depression screening. []1-4 = Minimal depression   []5-9 = Mild depression   []10-14 = Moderate depression   []15-19 = Moderately severe depression   []20-27 = Severe depression  PHQ Scores 10/21/2020 2018   PHQ2 Score 0 4   PHQ9 Score 0 22     Review of Systems   Constitutional: Negative for activity change, appetite change, fatigue and unexpected weight change. HENT: Negative for congestion. Eyes: Positive for visual disturbance. Negative for photophobia. Respiratory: Negative for chest tightness, shortness of breath, wheezing and stridor. Cardiovascular: Negative for chest pain, palpitations and leg swelling. Gastrointestinal: Negative for abdominal distention and abdominal pain. Genitourinary: Negative for difficulty urinating, flank pain, frequency, hematuria and vaginal pain. Musculoskeletal: Negative for arthralgias and myalgias. Neurological: Negative for dizziness, light-headedness and numbness.    Psychiatric/Behavioral: Negative for agitation, decreased concentration and hallucinations. The patient is not nervous/anxious and is not hyperactive.         Patient Active Problem List    Diagnosis Date Noted    Constipation 03/10/2021    Mild intermittent asthma without complication 37/41/7126    Sprain of deltoid ligament of left ankle 12/07/2020    Plantar fasciitis 12/07/2020    Family history of Clifton syndrome 12/07/2020    Mild persistent asthma without complication 94/68/8877    LSIL w/ (-) HPV cotesting 06/27/2017    Mirena IUD 6/27/17 06/27/2017    Oligomenorrhea 05/16/2017    [facility] 49/91/4218    Depression 11/11/2016    Hypothyroidism 11/11/2016        Past Surgical History:   Procedure Laterality Date    BONE MARROW TRANSPLANT  1994    DONOR;  for Brother    CYST REMOVAL      LOWER LIP    CA COLONOSCOPY W/BIOPSY SINGLE/MULTIPLE N/A 10/6/2017    COLONOSCOPY performed by Bravo Day DO at [address] [facility] EXTRACTION       Family History   Problem Relation Age of Onset    Other Mother     Other Father     Cervical Cancer Maternal Grandmother     Cancer Maternal Grandmother         COLON    Diabetes Paternal Grandfather      Current Outpatient Medications   Medication Sig Dispense Refill    docusate sodium (EQUATE STOOL SOFTENER) 100 MG capsule TAKE 1 CAPSULE BY MOUTH TWICE DAILY AS NEEDED FOR  CONSTIPATION 60 capsule 1    levothyroxine (EUTHYROX) 125 MCG tablet Take 1 tablet by mouth once daily 90 tablet 1    insulin lispro (ADMELOG) 100 UNIT/ML injection vial INJECT 25 UNITS SUBCUTANEOUSLY THREE TIMES DAILY BEFORE MEALS 90 mL 0    atorvastatin (LIPITOR) 20 MG tablet Take 1 tablet by mouth once daily 90 tablet 3    Insulin Syringe-Needle U-100 ([employer]ER INSULIN SYR 0.3CC/30G) 30G X 5/16\" 0.3 ML MISC Use as directed 100 each 0    busPIRone (BUSPAR) 15 MG tablet       albuterol sulfate HFA (VENTOLIN HFA) 108 (90 Base) MCG/ACT inhaler Inhale 2 puffs into the lungs every 6 hours as needed for Wheezing 1 Inhaler 3    Insulin Degludec (TRESIBA) 100 UNIT/ML SOLN 18  UNITS DAILY ONCE DAILY 2 vial 0    methylphenidate (METADATE CD) 20 MG extended release capsule       loratadine (CLARITIN) 10 MG tablet Take 1 tablet by mouth daily 30 tablet 0    Accu-Chek Multiclix Lancets MISC 3 daily. give  Covered by insurance 100 each 5     MG CAPS   1    Alpha-Lipoic Acid 100 MG CAPS       B Complex Vitamins (B COMPLEX 100 PO)       Cholecalciferol (VITAMIN D3) 88248 units TABS       venlafaxine (EFFEXOR XR) 75 MG extended release capsule Take 125 mg by mouth daily       lamoTRIgine (LAMICTAL) 100 MG tablet Take 100 mg by mouth daily       Current Facility-Administered Medications   Medication Dose Route Frequency Provider Last Rate Last Admin    levonorgestrel (MIRENA) IUD 52 mg 1 each  1 each Intrauterine Once Robbie Girt, DO   1 each at 17 1057       Allergies   Allergen Reactions    Hydrocortisone Itching     Per pt  States worsens her eczema        Social History     Tobacco Use    Smoking status: Former Smoker     Packs/day: 0.50     Years: 3.00     Pack years: 1.50     Types: Cigarettes     Quit date: 2016     Years since quittin.8    Smokeless tobacco: Never Used   Vaping Use    Vaping Use: Never used   Substance Use Topics    Alcohol use: Yes     Alcohol/week: 1.0 standard drinks     Types: 1 Standard drinks or equivalent per week     Comment: ooccasionally    Drug use: Yes     Types: Marijuana     Comment: DAILY        PHYSICAL EXAMINATION:  Vital Signs: (As obtained by patient/caregiver or practitioner observation)  No flowsheet data found.      Constitutional: [x] Appears well-developed and well-nourished [x] No apparent distress      [] Abnormal-   Mental status  [x] Alert and awake  [x] Oriented to person/place/time [x]Able to follow commands      Eyes:  EOM    [x]  Normal  [] Abnormal-  Sclera  [x]  Normal  [] Abnormal - Discharge [x]  None visible  [] Abnormal -    HENT:   [x] Normocephalic, atraumatic. [] Abnormal   [x] Mouth/Throat: Mucous membranes are moist.     External Ears [x] Normal  [] Abnormal-     Neck: [x] No visualized mass     Pulmonary/Chest: [x] Respiratory effort normal.  [x] No visualized signs of difficulty breathing or respiratory distress        [] Abnormal     Musculoskeletal:   [x] Normal gait with no signs of ataxia         [x] Normal range of motion of neck        [] Abnormal-     Neurological:        [x] No Facial Asymmetry (Cranial nerve 7 motor function) (limited exam to video visit)          [x] No gaze palsy        [] Abnormal-     Skin:        [x] No significant exanthematous lesions or discoloration noted on facial skin         [] Abnormal-     Psychiatric:       [x] Normal Affect [x] No Hallucinations        [x] Abnormal- Anxious, with pressured speech    Other pertinent observable physical exam findings-   Lab Results   Component Value Date    WBC 6.3 10/21/2020    HGB 15.8 10/21/2020    HCT 46.2 (H) 10/21/2020    MCV 92.7 10/21/2020     10/21/2020     Lab Results   Component Value Date     10/21/2020    K 4.4 10/21/2020     10/21/2020    CO2 30 10/21/2020    BUN 10 10/21/2020    CREATININE 0.72 10/21/2020    GLUCOSE 66 10/21/2020    CALCIUM 9.4 10/21/2020        Due to this being a TeleHealth encounter, evaluation of the following organ systems is limited: Vitals/Constitutional/EENT/Resp/CV/GI//MS/Neuro/Skin/Heme-Lymph-Imm. ASSESSMENT/PLAN:  1. Type 1 diabetes mellitus without complication ([facility]  Controlled continue same medications follow-up with endocrinologist we will get A1c results    2. Mild intermittent asthma without complication  Stable on current treatment     3. Acquired hypothyroidism  Recheck TSH labs reprinted  - levothyroxine (EUTHYROX) 125 MCG tablet; Take 1 tablet by mouth once daily  Dispense: 90 tablet; Refill: 1    4.  Constipation, unspecified following organ systems was limited:Vitals/Constitutional/EENT/Resp/CV/GI//MS/Neuro/Skin/Heme-Lymph-Imm. Services were provided through a video synchronous discussion virtually to substitute for in-person clinic visit. This is a telehealth visit that was performed with the originating site at Patient Location: home and provider Location of [place], [place]. Verbal consent to participate in video visit was obtained. Patient ID verified by me prior to start of this visit  I discussed with the patient the nature of our telehealth visits via interactive/real-time audio/video that:  - I would evaluate the patient and recommend diagnostics and treatments based on my assessment  - Our sessions are not being recorded and that personal health information is protected  - Our team would provide follow up care in person if/when the patient needs it. Pursuant to the emergency declaration under the [address] [place] [address], [address] [place] [address] authority and the [employer] Act, this Virtual Visit was conducted with patient's (and/or legal guardian's) consent, to reduce the patient's risk of exposure to COVID-19 and provide necessary medical care. The patient (and/or legal guardian) has also been advised to contact this office for worsening conditions or problems, and seek emergency medical treatment and/or call 911 if deemed necessary. This note was completed by using the assistance of a speech-recognition program. However, inadvertent computerized transcription errors may be present. Although every effort was made to ensure accuracy, no guarantees can be provided that every mistake has been identified and corrected by editing.   Electronically signed by Massimo English MD on 7/21/21 at 1:22 PM EDT

## 2021-10-28 DIAGNOSIS — K59.00 CONSTIPATION, UNSPECIFIED CONSTIPATION TYPE: ICD-10-CM

## 2021-10-28 DIAGNOSIS — K62.5 RECTAL BLEEDING: ICD-10-CM

## 2021-10-28 NOTE — TELEPHONE ENCOUNTER
Please Approve or Refuse.   Send to Pharmacy per Pt's Request:      Next Visit Date:  Visit date not found   Last Visit Date: 7/21/2021    Hemoglobin A1C (%)   Date Value   11/25/2020 6.9   07/27/2020 6.3   03/25/2020 7.6             ( goal A1C is < 7)   BP Readings from Last 3 Encounters:   12/07/20 120/80   10/21/20 122/80   07/27/20 116/72          (goal 120/80)  BUN   Date Value Ref Range Status   10/21/2020 10 6 - 20 mg/dL Final     CREATININE   Date Value Ref Range Status   10/21/2020 0.72 0.50 - 0.90 mg/dL Final     Potassium   Date Value Ref Range Status   10/21/2020 4.4 3.7 - 5.3 mmol/L Final
no

## 2021-10-29 RX ORDER — DOCUSATE SODIUM 100 MG/1
CAPSULE, LIQUID FILLED ORAL
Qty: 60 CAPSULE | Refills: 0 | Status: SHIPPED | OUTPATIENT
Start: 2021-10-29 | End: 2022-02-16

## 2022-02-16 DIAGNOSIS — K62.5 RECTAL BLEEDING: ICD-10-CM

## 2022-02-16 DIAGNOSIS — K59.00 CONSTIPATION, UNSPECIFIED CONSTIPATION TYPE: ICD-10-CM

## 2022-02-16 RX ORDER — DOCUSATE SODIUM 100 MG/1
CAPSULE, LIQUID FILLED ORAL
Qty: 60 CAPSULE | Refills: 0 | Status: SHIPPED | OUTPATIENT
Start: 2022-02-16 | End: 2022-04-19

## 2022-02-16 NOTE — TELEPHONE ENCOUNTER
Please Approve or Refuse.   Send to Pharmacy per Pt's Request:      Next Visit Date:  2/17/2022   Last Visit Date: 7/21/2021    Hemoglobin A1C (%)   Date Value   11/25/2020 6.9   07/27/2020 6.3   03/25/2020 7.6             ( goal A1C is < 7)   BP Readings from Last 3 Encounters:   12/07/20 120/80   10/21/20 122/80   07/27/20 116/72          (goal 120/80)  BUN   Date Value Ref Range Status   10/21/2020 10 6 - 20 mg/dL Final     CREATININE   Date Value Ref Range Status   10/21/2020 0.72 0.50 - 0.90 mg/dL Final     Potassium   Date Value Ref Range Status   10/21/2020 4.4 3.7 - 5.3 mmol/L Final

## 2022-02-17 ENCOUNTER — OFFICE VISIT (OUTPATIENT)
Dept: FAMILY MEDICINE CLINIC | Age: 32
End: 2022-02-17
Payer: MEDICAID

## 2022-02-17 VITALS
HEIGHT: 63 IN | BODY MASS INDEX: 32.96 KG/M2 | TEMPERATURE: 97.2 F | HEART RATE: 95 BPM | WEIGHT: 186 LBS | SYSTOLIC BLOOD PRESSURE: 134 MMHG | DIASTOLIC BLOOD PRESSURE: 80 MMHG | OXYGEN SATURATION: 98 %

## 2022-02-17 DIAGNOSIS — E10.9 TYPE 1 DIABETES MELLITUS WITHOUT COMPLICATION (HCC): Primary | ICD-10-CM

## 2022-02-17 DIAGNOSIS — E03.9 ACQUIRED HYPOTHYROIDISM: ICD-10-CM

## 2022-02-17 DIAGNOSIS — Z30.431 FAMILY PLANNING, IUD (INTRAUTERINE DEVICE) CHECK/REINSERTION/REMOVAL: ICD-10-CM

## 2022-02-17 DIAGNOSIS — J45.30 MILD PERSISTENT ASTHMA WITHOUT COMPLICATION: ICD-10-CM

## 2022-02-17 DIAGNOSIS — F32.A DEPRESSION, UNSPECIFIED DEPRESSION TYPE: ICD-10-CM

## 2022-02-17 LAB — HBA1C MFR BLD: 6.8 %

## 2022-02-17 PROCEDURE — 2022F DILAT RTA XM EVC RTNOPTHY: CPT | Performed by: FAMILY MEDICINE

## 2022-02-17 PROCEDURE — G8427 DOCREV CUR MEDS BY ELIG CLIN: HCPCS | Performed by: FAMILY MEDICINE

## 2022-02-17 PROCEDURE — G8482 FLU IMMUNIZE ORDER/ADMIN: HCPCS | Performed by: FAMILY MEDICINE

## 2022-02-17 PROCEDURE — G8417 CALC BMI ABV UP PARAM F/U: HCPCS | Performed by: FAMILY MEDICINE

## 2022-02-17 PROCEDURE — 83036 HEMOGLOBIN GLYCOSYLATED A1C: CPT | Performed by: FAMILY MEDICINE

## 2022-02-17 PROCEDURE — 3044F HG A1C LEVEL LT 7.0%: CPT | Performed by: FAMILY MEDICINE

## 2022-02-17 PROCEDURE — 1036F TOBACCO NON-USER: CPT | Performed by: FAMILY MEDICINE

## 2022-02-17 PROCEDURE — 99214 OFFICE O/P EST MOD 30 MIN: CPT | Performed by: FAMILY MEDICINE

## 2022-02-17 RX ORDER — INSULIN GLARGINE 100 [IU]/ML
INJECTION, SOLUTION SUBCUTANEOUS
COMMUNITY
Start: 2021-12-20

## 2022-02-17 RX ORDER — ATORVASTATIN CALCIUM 10 MG/1
TABLET, FILM COATED ORAL
COMMUNITY
Start: 2022-01-11 | End: 2022-02-17 | Stop reason: DRUGHIGH

## 2022-02-17 RX ORDER — BUSPIRONE HYDROCHLORIDE 15 MG/1
1 TABLET ORAL
COMMUNITY
Start: 2020-01-30

## 2022-02-17 RX ORDER — VENLAFAXINE HYDROCHLORIDE 150 MG/1
CAPSULE, EXTENDED RELEASE ORAL
COMMUNITY
Start: 2021-12-13

## 2022-02-17 RX ORDER — BLOOD SUGAR DIAGNOSTIC
STRIP MISCELLANEOUS
COMMUNITY
Start: 2022-01-04

## 2022-02-17 SDOH — ECONOMIC STABILITY: FOOD INSECURITY: WITHIN THE PAST 12 MONTHS, YOU WORRIED THAT YOUR FOOD WOULD RUN OUT BEFORE YOU GOT MONEY TO BUY MORE.: NEVER TRUE

## 2022-02-17 SDOH — ECONOMIC STABILITY: FOOD INSECURITY: WITHIN THE PAST 12 MONTHS, THE FOOD YOU BOUGHT JUST DIDN'T LAST AND YOU DIDN'T HAVE MONEY TO GET MORE.: NEVER TRUE

## 2022-02-17 ASSESSMENT — ENCOUNTER SYMPTOMS
SINUS PAIN: 0
BACK PAIN: 0
SHORTNESS OF BREATH: 0
WHEEZING: 0
COUGH: 0
ABDOMINAL DISTENTION: 0
APNEA: 0
CONSTIPATION: 0
PHOTOPHOBIA: 0

## 2022-02-17 ASSESSMENT — PATIENT HEALTH QUESTIONNAIRE - PHQ9
1. LITTLE INTEREST OR PLEASURE IN DOING THINGS: 0
SUM OF ALL RESPONSES TO PHQ QUESTIONS 1-9: 0
SUM OF ALL RESPONSES TO PHQ9 QUESTIONS 1 & 2: 0
SUM OF ALL RESPONSES TO PHQ QUESTIONS 1-9: 0
2. FEELING DOWN, DEPRESSED OR HOPELESS: 0

## 2022-02-17 ASSESSMENT — SOCIAL DETERMINANTS OF HEALTH (SDOH): HOW HARD IS IT FOR YOU TO PAY FOR THE VERY BASICS LIKE FOOD, HOUSING, MEDICAL CARE, AND HEATING?: NOT HARD AT ALL

## 2022-02-17 NOTE — PROGRESS NOTES
Chief Complaint   Patient presents with    Diabetes         Jayleen Funez  here today for follow up on chronic medical problems, go over labs and/or diagnostic studies, and medication refills. Diabetes      HPI: Patient is scheduled for follow-up on diabetes, well controlled A1c 6.8 today follows with endocrinologist.  Patient checks her sugars running within range. Is on insulin. Patient reports she is moving to [place] next week and does not have enough time for testing. Hypothyroidism on Synthroid is due for TSH check. Patient denies any fatigue tiredness. Asthma stable on current treatment denies any recent flareups. Depression stable, patient denies any anhedonia, enjoys her day-to-day activities. Is planning to go for school in [place]. Patient had IUD placed 7 years before and is planning to remove that. /80   Pulse 95   Temp 97.2 °F (36.2 °C)   Ht 5' 3\" (1.6 m)   Wt 186 lb (84.4 kg)   SpO2 98%   BMI 32.95 kg/m²    Body mass index is 32.95 kg/m². Wt Readings from Last 3 Encounters:   02/17/22 186 lb (84.4 kg)   12/16/20 180 lb (81.6 kg)   12/07/20 181 lb (82.1 kg)        []Negative depression screening. PHQ Scores 2/17/2022 10/21/2020 1/8/2018   PHQ2 Score 0 0 4   PHQ9 Score 0 0 22      []1-4 = Minimal depression   []5-9 = Milddepression   []10-14 = Moderate depression   []15-19 = Moderately severe depression   []20-27 = Severe depression    Discussed testing with the patient and all questions fully answered.     Abstract on 04/26/2021   Component Date Value Ref Range Status    Hemoglobin A1C 11/25/2020 6.9  % Final         Most recent labs reviewed:     Lab Results   Component Value Date    WBC 6.3 10/21/2020    HGB 15.8 10/21/2020    HCT 46.2 (H) 10/21/2020    MCV 92.7 10/21/2020     10/21/2020       @BRIEFLAB(NA,K,CL,CO2,BUN,CREATININE,GLUCOSE,CALCIUM)@     Lab Results   Component Value Date    ALT 18 10/21/2020    AST 21 10/21/2020    ALKPHOS 88 10/21/2020    BILITOT 0.39 10/21/2020       Lab Results   Component Value Date    TSH 1.27 11/20/2019       Lab Results   Component Value Date    CHOL 166 10/21/2020     Lab Results   Component Value Date    TRIG 29 10/21/2020     Lab Results   Component Value Date    HDL 80 10/21/2020    HDL 91 01/08/2018     Lab Results   Component Value Date    LDLCHOLESTEROL 80 10/21/2020    LDLCHOLESTEROL 74 01/08/2018     Lab Results   Component Value Date    VLDL NOT REPORTED 10/21/2020    VLDL NOT REPORTED 01/08/2018     Lab Results   Component Value Date    CHOLHDLRATIO 2.1 10/21/2020    CHOLHDLRATIO 1.9 01/08/2018       Lab Results   Component Value Date    LABA1C 6.8 02/17/2022       No results found for: [id number]    No results found for: FOLATE    No results found for: IRON, TIBC, FERRITIN    Lab Results   Component Value Date    VITD25 14.9 (L) 04/07/2017             Current Outpatient Medications   Medication Sig Dispense Refill    ONETOUCH VERIO strip       LANTUS 100 UNIT/ML injection vial       busPIRone (BUSPAR) 15 MG tablet Take 1 tablet by mouth      venlafaxine (EFFEXOR XR) 150 MG extended release capsule       docusate sodium (EQUATE STOOL SOFTENER) 100 MG capsule TAKE 1 CAPSULE BY MOUTH TWICE DAILY AS NEEDED FOR CONSTIPATION 60 capsule 0    levothyroxine (EUTHYROX) 125 MCG tablet Take 1 tablet by mouth once daily 90 tablet 1    insulin lispro (ADMELOG) 100 UNIT/ML injection vial INJECT 25 UNITS SUBCUTANEOUSLY THREE TIMES DAILY BEFORE MEALS 90 mL 0    atorvastatin (LIPITOR) 20 MG tablet Take 1 tablet by mouth once daily 90 tablet 3    Insulin Syringe-Needle U-100 ([employer] INSULIN SYR 0.3CC/30G) 30G X 5/16\" 0.3 ML MISC Use as directed 100 each 0    albuterol sulfate HFA (VENTOLIN HFA) 108 (90 Base) MCG/ACT inhaler Inhale 2 puffs into the lungs every 6 hours as needed for Wheezing 1 Inhaler 3    methylphenidate (METADATE CD) 20 MG extended release capsule       loratadine (CLARITIN) 10 MG tablet Take 1 tablet by mouth daily 30 tablet 0    Accu-Chek Multiclix Lancets MISC 3 daily. give  Covered by insurance 100 each 5    lamoTRIgine (LAMICTAL) 100 MG tablet Take 100 mg by mouth daily       Current Facility-Administered Medications   Medication Dose Route Frequency Provider Last Rate Last Admin    levonorgestrel (MIRENA) IUD 52 mg 1 each  1 each IntraUTERine Once Chang Hodges, DO   1 each at 17 1057             Social History     Socioeconomic History    Marital status: Single     Spouse name: Not on file    Number of children: Not on file    Years of education: Not on file    Highest education level: Not on file   Occupational History    Not on file   Tobacco Use    Smoking status: Former Smoker     Packs/day: 0.50     Years: 3.00     Pack years: 1.50     Types: Cigarettes     Quit date: 2016     Years since quittin.4    Smokeless tobacco: Never Used   Vaping Use    Vaping Use: Never used   Substance and Sexual Activity    Alcohol use: Yes     Alcohol/week: 1.0 standard drink     Types: 1 Standard drinks or equivalent per week     Comment: ooccasionally    Drug use: Yes     Types: Marijuana Samuel Free)     Comment: DAILY    Sexual activity: Yes   Other Topics Concern    Not on file   Social History Narrative    Not on file     Social Determinants of Health     Financial Resource Strain: Low Risk     Difficulty of Paying Living Expenses: Not hard at all   Food Insecurity: No Food Insecurity    Worried About [address] in the Last Year: Never true    [facility] in the Last Year: Never true   Transportation Needs:     Lack of Transportation (Medical): Not on file    Lack of Transportation (Non-Medical):  Not on file   Physical Activity:     Days of Exercise per Week: Not on file    Minutes of Exercise per Session: Not on file   Stress:     Feeling of Stress : Not on file   Social Connections:     Frequency of Communication with Friends and Family: Not on file    Frequency of Social Gatherings with Friends and Family: Not on file    Attends Anabaptist Services: Not on file    Active Member of Clubs or Organizations: Not on file    Attends Club or Organization Meetings: Not on file    Marital Status: Not on file   Intimate Partner Violence:     Fear of Current or Ex-Partner: Not on file    Emotionally Abused: Not on file    Physically Abused: Not on file    Sexually Abused: Not on file   Housing Stability:     Unable to Pay for Housing in the Last Year: Not on file    Number of [place] in the Last Year: Not on file    Unstable Housing in the Last Year: Not on file     Counseling given: Not Answered        Family History   Problem Relation Age of Onset    Other Mother     Other Father     Cervical Cancer Maternal Grandmother     Cancer Maternal Grandmother         COLON    Diabetes Paternal Grandfather              -rest of complaints with corresponding details per ROS    The patient's past medical, surgical, social, and family history as well as her current medications and allergies were reviewed as documented intoday's encounter. Review of Systems   Constitutional: Negative for activity change, appetite change, fatigue and unexpected weight change. HENT: Negative for congestion and sinus pain. Eyes: Negative for photophobia and visual disturbance. Respiratory: Negative for apnea, cough, shortness of breath and wheezing. Cardiovascular: Negative for chest pain, palpitations and leg swelling. Gastrointestinal: Negative for abdominal distention and constipation. Endocrine: Negative for polyuria. Genitourinary: Negative for difficulty urinating, flank pain and urgency. Musculoskeletal: Positive for arthralgias. Negative for back pain, gait problem and neck stiffness. Neurological: Negative for speech difficulty, weakness, light-headedness and numbness.    Psychiatric/Behavioral: Negative for agitation, decreased concentration, dysphoric mood and sleep disturbance. The patient is nervous/anxious. The patient is not hyperactive. Physical Exam  Vitals reviewed. Constitutional:       Appearance: Normal appearance. She is obese. HENT:      Head: Normocephalic and atraumatic. Nose: Nose normal.   Eyes:      Pupils: Pupils are equal, round, and reactive to light. Cardiovascular:      Rate and Rhythm: Normal rate and regular rhythm. Pulses:           Posterior tibial pulses are 2+ on the right side and 2+ on the left side. Heart sounds: Normal heart sounds. Pulmonary:      Effort: Pulmonary effort is normal.      Breath sounds: Normal breath sounds. No wheezing or rhonchi. Abdominal:      General: Bowel sounds are normal. There is no distension. Palpations: Abdomen is soft. There is no mass. Tenderness: There is no abdominal tenderness. Musculoskeletal:      Cervical back: Normal range of motion. Right foot: Normal range of motion. Left foot: Normal range of motion. Feet:      Right foot:      Protective Sensation: 5 sites tested. 5 sites sensed. Skin integrity: Skin integrity normal.      Left foot:      Protective Sensation: 5 sites tested. 5 sites sensed. Skin integrity: Skin integrity normal.      Comments: Feeble pulses. Skin:     General: Skin is warm. Neurological:      Mental Status: She is alert and oriented to person, place, and time. Cranial Nerves: Cranial nerves are intact. No cranial nerve deficit. Motor: No weakness. Coordination: Romberg sign negative. Psychiatric:         Attention and Perception: Attention and perception normal.         Mood and Affect: Mood is not anxious or depressed. Speech: She is communicative. Behavior: Behavior is not agitated or slowed. ASSESSMENT AND PLAN      1. Type 1 diabetes mellitus without complication (HCC)  Controlled continue same medications.   Follow-up with endocrinologist discussed with patient to do blood work before moving. Discussed with patient to follow-up with podiatrist.  - POCT glycosylated hemoglobin (Hb A1C)  -  DIABETES FOOT EXAM  - Microalbumin / Creatinine Urine Ratio; Future  - Lipid Panel; Future    2. Acquired hypothyroidism  Continue Synthroid recheck TSH  - TSH; Future    3. Mild persistent asthma without complication  Stable continue inhalers    4. Depression, unspecified depression type  Stable continue BuSpar Effexor follow-up with psychiatrist    5.  Family planning, IUD (intrauterine device) check/reinsertion/removal  Referral placed  - Amaya Hein CNP, OB/GYN, [place]      Orders Placed This Encounter   Procedures    Microalbumin / Creatinine Urine Ratio     Standing Status:   Future     Standing Expiration Date:   2/17/2023    TSH     Standing Status:   Future     Standing Expiration Date:   2/17/2023    Lipid Panel     Standing Status:   Future     Standing Expiration Date:   2/17/2023     Order Specific Question:   Is Patient Fasting?/# of Hours     Answer:   yes, 8-10 hours   [facility] Amaya Abdi CNP, OB/GYN, [place]     Referral Priority:   Routine     Referral Type:   Eval and Treat     Referral Reason:   Specialty Services Required     Referred to Provider:   Leonel Hatchet, APRN - CNP     Requested Specialty:   Certified Nurse Practitioner     Number of Visits Requested:   1    POCT glycosylated hemoglobin (Hb A1C)     DIABETES FOOT EXAM         Medications Discontinued During This Encounter   Medication Reason    busPIRone (BUSPAR) 15 MG tablet     venlafaxine (EFFEXOR XR) 75 MG extended release capsule     Insulin Degludec (TRESIBA) 100 UNIT/ML SOLN     Alpha-Lipoic Acid 100 MG CAPS Therapy completed    atorvastatin (LIPITOR) 10 MG tablet DOSE ADJUSTMENT    B Complex Vitamins (B COMPLEX 100 PO) Therapy completed    Cholecalciferol (VITAMIN D3) 46279 units TABS Therapy completed     MG CAPS Therapy completed       [employer] received counseling on the following healthy behaviors: nutrition, exercise and medication adherence  Reviewed prior labs and health maintenance  Continue current medications, diet and exercise. Discussed use, benefit, and side effects of prescribed medications. Barriers to medication compliance addressed. Patient given educational materials - see patient instructions  Was a self-tracking handout given in paper form or via [facility]? Yes    Requested Prescriptions      No prescriptions requested or ordered in this encounter       All patient questions answered. Patient voiced understanding. Quality Measures    Body mass index is 32.95 kg/m². Elevated. Weight control planned discussed daily exercise regimen and Healthy diet and regular exercise. BP: 134/80 Blood pressure is normal. Treatment plan consists of No treatment change needed. Lab Results   Component Value Date    LDLCHOLESTEROL 80 10/21/2020    (goal LDL reduction with dx if diabetes is 50% LDL reduction)      PHQ Scores 2/17/2022 10/21/2020 1/8/2018   PHQ2 Score 0 0 4   PHQ9 Score 0 0 22     Interpretation of Total Score Depression Severity: 1-4 = Minimal depression, 5-9 = Mild depression, 10-14 = Moderate depression, 15-19 = Moderately severe depression, 20-27 = Severe depression    The patient'spast medical, surgical, social, and family history as well as her   current medications and allergies were reviewed as documented in today's encounter. Medications, labs, diagnostic studies, consultations andfollow-up as documented in this encounter. No follow-ups on file. Patient wasseen with total face to face time of 30 minutes. More than 50% of this visit was counseling and education. No future appointments. This note was completed by using the assistance of a speech-recognition program. However, inadvertent computerized transcription errors may be present.  Althoughevery effort was made to ensure accuracy, no guarantees can be provided that every mistake has been identified and corrected by editing.   Electronically signed by Dara Sanchez MD on 2/17/2022  1:14 PM

## 2022-02-17 NOTE — PROGRESS NOTES
Visit Information    Have you changed or started any medications since your last visit including any over-the-counter medicines, vitamins, or herbal medicines? no   Have you stopped taking any of your medications? Is so, why? -  no  Are you having any side effects from any of your medications? - no    Have you seen any other physician or provider since your last visit? yes -    Have you had any other diagnostic tests since your last visit?  no   Have you been seen in the emergency room and/or had an admission in a hospital since we last saw you?  no   Have you had your routine dental cleaning in the past 6 months?  yes -      Do you have an active MyChart account? If no, what is the barrier?   Yes    Patient Care Team:  James Mcconnell MD as PCP - General (Family Medicine)  James Mcconnell MD as PCP - [facility]  Chay Valdes MD as Consulting Physician (Gastroenterology)    Medical History Review  Past Medical, Family, and Social History reviewed and does contribute to the patient presenting condition    Health Maintenance   Topic Date Due    Hepatitis C screen  Never done    Diabetic foot exam  Never done    Depression Monitoring  Never done    Diabetic retinal exam  Never done    TSH testing  11/20/2020    Diabetic microalbuminuria test  10/21/2021    Lipid screen  10/21/2021    A1C test (Diabetic or Prediabetic)  11/25/2021    Cervical cancer screen  08/07/2024    DTaP/Tdap/Td vaccine (2 - Td or Tdap) 04/07/2027    Pneumococcal 0-64 years Vaccine (2 of 2 - PPSV23) 12/30/2056    Flu vaccine  Completed    COVID-19 Vaccine  Completed    HIV screen  Completed    Hepatitis A vaccine  Aged Out    Hib vaccine  Aged Out    Meningococcal (ACWY) vaccine  Aged Out    Hepatitis B vaccine  Discontinued    Varicella vaccine  Discontinued

## 2022-02-20 DIAGNOSIS — J45.30 MILD PERSISTENT ASTHMA WITHOUT COMPLICATION: ICD-10-CM

## 2022-02-21 RX ORDER — ALBUTEROL SULFATE 90 UG/1
AEROSOL, METERED RESPIRATORY (INHALATION)
Qty: 18 G | Refills: 0 | Status: SHIPPED | OUTPATIENT
Start: 2022-02-21

## 2022-04-18 DIAGNOSIS — K59.00 CONSTIPATION, UNSPECIFIED CONSTIPATION TYPE: ICD-10-CM

## 2022-04-18 DIAGNOSIS — K62.5 RECTAL BLEEDING: ICD-10-CM

## 2022-04-19 RX ORDER — DOCUSATE SODIUM 100 MG/1
CAPSULE, LIQUID FILLED ORAL
Qty: 60 CAPSULE | Refills: 0 | Status: SHIPPED | OUTPATIENT
Start: 2022-04-19

## 2022-05-26 DIAGNOSIS — E03.9 ACQUIRED HYPOTHYROIDISM: ICD-10-CM

## 2022-05-26 RX ORDER — LEVOTHYROXINE SODIUM 0.12 MG/1
TABLET ORAL
Qty: 90 TABLET | Refills: 0 | Status: SHIPPED | OUTPATIENT
Start: 2022-05-26